# Patient Record
Sex: FEMALE | Race: WHITE | NOT HISPANIC OR LATINO | Employment: OTHER | URBAN - NONMETROPOLITAN AREA
[De-identification: names, ages, dates, MRNs, and addresses within clinical notes are randomized per-mention and may not be internally consistent; named-entity substitution may affect disease eponyms.]

---

## 2017-01-10 ENCOUNTER — OFFICE VISIT (OUTPATIENT)
Dept: FAMILY MEDICINE CLINIC | Facility: CLINIC | Age: 62
End: 2017-01-10

## 2017-01-10 ENCOUNTER — TELEPHONE (OUTPATIENT)
Dept: FAMILY MEDICINE CLINIC | Facility: CLINIC | Age: 62
End: 2017-01-10

## 2017-01-10 VITALS
SYSTOLIC BLOOD PRESSURE: 104 MMHG | HEIGHT: 66 IN | OXYGEN SATURATION: 99 % | TEMPERATURE: 97.5 F | WEIGHT: 185.6 LBS | HEART RATE: 88 BPM | BODY MASS INDEX: 29.83 KG/M2 | DIASTOLIC BLOOD PRESSURE: 60 MMHG

## 2017-01-10 DIAGNOSIS — J01.00 ACUTE MAXILLARY SINUSITIS, RECURRENCE NOT SPECIFIED: Primary | ICD-10-CM

## 2017-01-10 DIAGNOSIS — R42 DIZZINESS: ICD-10-CM

## 2017-01-10 DIAGNOSIS — H93.8X1 EAR CONGESTION, RIGHT: ICD-10-CM

## 2017-01-10 DIAGNOSIS — R53.83 FATIGUE, UNSPECIFIED TYPE: ICD-10-CM

## 2017-01-10 LAB
BASOPHILS NFR BLD AUTO: 0.7 % (ref 0–2)
EOSINOPHIL NFR BLD AUTO: 0.9 % (ref 0–7)
ERYTHROCYTE [DISTWIDTH] IN BLOOD: 12.5 % (ref 11.5–14.5)
GRANULOCYTES NFR BLD AUTO: 59.6 % (ref 37–80)
HCT VFR BLD CALC: 40.2 % (ref 35–45)
HGB BLD-MCNC: 13 GM/DL (ref 12–15.5)
LYMPHOCYTES NFR BLD AUTO: 27.7 % (ref 10–50)
MCH RBC QN: 28 PG (ref 26–34)
MCHC RBC-ENTMCNC: 32.3 GM/DL (ref 31.4–36)
MCV RBC: 86.6 FL (ref 80–98)
MONOCYTES NFR BLD AUTO: 11.1 % (ref 0–12)
NRBC BLD AUTO-RTO: 0 %
NRBC SPEC MANUAL: 0
PLATELET # BLD: 187 X1000/MM3 (ref 150–450)
PMV BLD: 11.1 FL (ref 8–12)
RBC # BLD: 4.64 MEGA/MM3 (ref 3.77–5.16)
WBC # BLD: 4.6 X1000/UL (ref 3.2–9.8)

## 2017-01-10 PROCEDURE — 96372 THER/PROPH/DIAG INJ SC/IM: CPT | Performed by: NURSE PRACTITIONER

## 2017-01-10 PROCEDURE — 99213 OFFICE O/P EST LOW 20 MIN: CPT | Performed by: NURSE PRACTITIONER

## 2017-01-10 RX ORDER — CEFTRIAXONE 1 G/1
1 INJECTION, POWDER, FOR SOLUTION INTRAMUSCULAR; INTRAVENOUS ONCE
Status: COMPLETED | OUTPATIENT
Start: 2017-01-10 | End: 2017-01-10

## 2017-01-10 RX ORDER — CEFDINIR 300 MG/1
300 CAPSULE ORAL 2 TIMES DAILY
Qty: 20 CAPSULE | Refills: 0 | Status: SHIPPED | OUTPATIENT
Start: 2017-01-10 | End: 2017-01-20

## 2017-01-10 RX ORDER — MECLIZINE HYDROCHLORIDE 25 MG/1
25 TABLET ORAL 3 TIMES DAILY PRN
Qty: 30 TABLET | Refills: 2 | Status: SHIPPED | OUTPATIENT
Start: 2017-01-10 | End: 2017-01-20

## 2017-01-10 RX ORDER — ESTRADIOL 2 MG/1
1 RING VAGINAL WEEKLY
Refills: 3 | COMMUNITY
Start: 2016-12-01 | End: 2017-07-06

## 2017-01-10 RX ORDER — METHYLPREDNISOLONE ACETATE 80 MG/ML
80 INJECTION, SUSPENSION INTRA-ARTICULAR; INTRALESIONAL; INTRAMUSCULAR; SOFT TISSUE ONCE
Status: COMPLETED | OUTPATIENT
Start: 2017-01-10 | End: 2017-01-10

## 2017-01-10 RX ADMIN — METHYLPREDNISOLONE ACETATE 80 MG: 80 INJECTION, SUSPENSION INTRA-ARTICULAR; INTRALESIONAL; INTRAMUSCULAR; SOFT TISSUE at 14:39

## 2017-01-10 RX ADMIN — CEFTRIAXONE 1 G: 1 INJECTION, POWDER, FOR SOLUTION INTRAMUSCULAR; INTRAVENOUS at 14:39

## 2017-01-10 NOTE — PROGRESS NOTES
"Chief Complaint   Patient presents with   • Sinus Problem     off and on \"due to weather\", worsened today   • Loss of Consciousness     c/o \"fainting\" x3 over 2-3 months; denies LOC       Subjective   HPI:   Bertha Moncada is a 61 y.o. female presents to the office for evaluation of:  Sinus Problem   This is a new problem. The current episode started today (off and on \"due to weather. It got worse today\"). The problem has been rapidly worsening since onset. There has been no fever. Her pain is at a severity of 5/10. The pain is mild. Associated symptoms include chills, congestion, coughing, ear pain, headaches and sneezing. Pertinent negatives include no diaphoresis, hoarse voice, neck pain, shortness of breath, sinus pressure, sore throat or swollen glands. Treatments tried: Francisca Farson cold and flu. The treatment provided mild relief.     She presents today with acute complaints of dizziness resulting in fall but not loss of consciousness X 3 over 2-3 months. She c/o of dizziness requiring holding onto a stationary object like the wall.   She also c/o headache, dry cough, chills, fever with Tmax 100.3, ear pain, body aches, rhinorrhea, and overall feeling of malaise X 3 days. She has had exposure to the flu recently.   She has taken alkaseltzer cold and flu with poor relief of symptoms, but she states it does help her sleep.     Patient states she \"feels weird\". She expresses nausea with feeling faint, but denies emesis. She denies chest pain, discomfort, palpitations, and radiation of pain with faint feeling spells. Change in position exacerbates her faint feelings. She denies SOA.     She was seen by cardiology several years ago for chest pain and abnormal EKG after the loss of her child. She states her EKG are always abnormal        Family History   Problem Relation Age of Onset   • Cancer Other      Breast Cancer; Ovarian Cancer   • Hypertension Other    • Cancer Mother    • Ovarian cancer Mother    • Thyroid " disease Mother    • Heart disease Father    • Alcohol abuse Father    • Diabetes Sister      Social History     Social History   • Marital status:      Spouse name: Matthew    • Number of children: N/A   • Years of education: N/A     Occupational History   • Not on file.     Social History Main Topics   • Smoking status: Never Smoker   • Smokeless tobacco: Never Used   • Alcohol use No   • Drug use: No   • Sexual activity: Yes     Partners: Male     Other Topics Concern   • Not on file     Social History Narrative       Review of Systems   Constitutional: Positive for chills. Negative for activity change, diaphoresis, fatigue, fever and unexpected weight change.   HENT: Positive for congestion, ear pain, rhinorrhea and sneezing. Negative for hoarse voice, postnasal drip, sinus pressure, sore throat and voice change.    Eyes: Negative.  Negative for pain and redness.   Respiratory: Positive for cough. Negative for choking, chest tightness, shortness of breath and wheezing.    Cardiovascular: Negative.  Negative for chest pain, palpitations and leg swelling.   Gastrointestinal: Negative.  Negative for abdominal distention, abdominal pain, constipation, diarrhea, nausea and rectal pain.   Endocrine: Negative.    Genitourinary: Negative.  Negative for decreased urine volume, difficulty urinating, dysuria, flank pain, hematuria and urgency.   Musculoskeletal: Negative.  Negative for gait problem and neck pain.   Skin: Negative.  Negative for rash and wound.   Allergic/Immunologic: Negative.    Neurological: Positive for light-headedness and headaches. Negative for dizziness, syncope, weakness and numbness.   Hematological: Negative.    Psychiatric/Behavioral: Negative.  Negative for agitation, behavioral problems, confusion, self-injury, sleep disturbance and suicidal ideas. The patient is not nervous/anxious.      14 Point ROS completed with pertinent positives discussed. All other systems reviewed and are  "negative.     The following portions of the patient's history were reviewed and updated as appropriate: allergies, current medications, past family history, past medical history, past social history, past surgical history and problem list.    Encounter Vitals:  Vitals:    01/10/17 1329 01/10/17 1332 01/10/17 1338   BP: 118/70 110/70 104/60   BP Location: Left arm Left arm Left arm   Patient Position: Sitting Standing Lying   Cuff Size: Adult Adult Adult   Pulse: 88     Temp: 97.5 °F (36.4 °C)     TempSrc: Temporal Artery      SpO2: 99%     Weight: 185 lb 9.6 oz (84.2 kg)     Height: 66\" (167.6 cm)     PainSc:   5     PainLoc: Generalized  Comment: \"aches all over\"         Objective:  Physical Exam   Constitutional: She is oriented to person, place, and time. Vital signs are normal. She appears well-developed and well-nourished.  Non-toxic appearance. She appears ill.   HENT:   Head: Normocephalic and atraumatic.   Right Ear: Tympanic membrane, external ear and ear canal normal.   Left Ear: Tympanic membrane, external ear and ear canal normal.   Nose: Right sinus exhibits maxillary sinus tenderness. Right sinus exhibits no frontal sinus tenderness. Left sinus exhibits maxillary sinus tenderness. Left sinus exhibits no frontal sinus tenderness.   Mouth/Throat: Uvula is midline and mucous membranes are normal. Posterior oropharyngeal edema present. No posterior oropharyngeal erythema. No tonsillar exudate.   Clear post nasal drip  Right ear congestion with serous fluid   Eyes: Lids are normal. Pupils are equal, round, and reactive to light. Right eye exhibits nystagmus. Left eye exhibits nystagmus.   Neck: Trachea normal and normal range of motion. Neck supple. No thyroid mass present.   Cardiovascular: Normal rate, regular rhythm, S1 normal, S2 normal, normal heart sounds and intact distal pulses.  Exam reveals no gallop and no friction rub.    No murmur heard.  Pulmonary/Chest: Effort normal and breath sounds normal. " No respiratory distress. She has no wheezes. She has no rales.   Abdominal: Soft. Normal appearance and bowel sounds are normal. She exhibits no mass. There is no rebound and no guarding.   Musculoskeletal: Normal range of motion.   Lymphadenopathy:     She has no cervical adenopathy.   Neurological: She is alert and oriented to person, place, and time. She has normal strength. No cranial nerve deficit or sensory deficit. Gait normal.   Skin: Skin is warm and dry. No rash noted.   Psychiatric: She has a normal mood and affect. Her speech is normal and behavior is normal. Judgment and thought content normal. Cognition and memory are normal.   Nursing note and vitals reviewed.    Pertinent Labs  Hospital Outpatient Visit on 12/30/2016   Component Date Value Ref Range Status   • Glucose 12/30/2016 107* 70.0 - 100.0 mg/dl Final   • BUN 12/30/2016 18  8.0 - 25.0 mg/dl Final   • Creatinine 12/30/2016 0.6  0.4 - 1.3 mg/dl Final   • Sodium 12/30/2016 141.0  134 - 146 mmol/L Final   • Potassium 12/30/2016 4.4  3.4 - 5.4 mmol/L Final   • Chloride 12/30/2016 106.0  100.0 - 112.0 mmol/L Final   • CO2 12/30/2016 28.0  20.0 - 32.0 mmol/L Final   • Calcium 12/30/2016 9.3  8.4 - 10.8 mg/dl Final   • Total Protein 12/30/2016 6.7  6.7 - 8.2 gm/dl Final   • Albumin 12/30/2016 4.0  3.2 - 5.5 gm/dl Final   • Total Bilirubin 12/30/2016 1.0  0.2 - 1.0 mg/dl Final   • Alkaline Phosphatase 12/30/2016 43  15 - 121 U/L Final   • ALT (SGPT) 12/30/2016 14  10 - 60 U/L Final   • AST (SGOT) 12/30/2016 18  10 - 60 U/L Final   • GFR MDRD Non  12/30/2016 101  45 - 104 mL/min/1.73 sq.M Final    Comment: Invalid if creatinine is changing or the patient is on dialysis. Use AA  result if patient is -American, non AA result otherwise.     • GFR MDRD  12/30/2016 123* 45 - 104 mL/min/1.73 sq.M Final   • Anion Gap 12/30/2016 7.0  5.0 - 15.0 mmol/L Final   • LDL Cholesterol  12/30/2016 119.0  0.0 - 129.0 mg/dl Final     LDL DESIRED: < 130 MG/DL     Labs have been independently reviewed.    Key Imaging/Tracings/POC Testing    Assessment and Plan:  Problem List Items Addressed This Visit     None      Visit Diagnoses     Acute maxillary sinusitis, recurrence not specified    -  Primary    Relevant Medications    cefTRIAXone (ROCEPHIN) injection 1 g (Start on 1/10/2017  3:00 PM)    methylPREDNISolone acetate (DEPO-medrol) injection 80 mg (Start on 1/10/2017  3:00 PM)    Dizziness        Ear congestion, right        Fatigue, unspecified type        Relevant Orders    CBC & Differential    Millie-Barr Virus VCA Antibody Panel        Bertha was seen today for sinus problem and loss of consciousness.    Diagnoses and all orders for this visit:    Acute maxillary sinusitis, recurrence not specified  -     cefTRIAXone (ROCEPHIN) injection 1 g; Inject 1 g into the shoulder, thigh, or buttocks 1 (One) Time.  -     methylPREDNISolone acetate (DEPO-medrol) injection 80 mg; Inject 1 mL into the shoulder, thigh, or buttocks 1 (One) Time.    Dizziness    Ear congestion, right    Fatigue, unspecified type  -     CBC & Differential  -     Millie-Barr Virus VCA Antibody Panel    Other orders  -     cefdinir (OMNICEF) 300 MG capsule; Take 1 capsule by mouth 2 (Two) Times a Day for 10 days.  -     meclizine (ANTIVERT) 25 MG tablet; Take 1 tablet by mouth 3 (Three) Times a Day As Needed for dizziness for up to 10 days.      Side effects of ordered medications discussed with patient.     Additional Notes/Instructions  Discussed possible cardiac etiology with Dr. Saldana. He will reassess possible need for cardiology work-up/referral during her appointment on Fri. 1/13/2017    Follow-Up  Return if symptoms worsen or fail to improve, for After ordered studies are completed.    Patient/caregiver verbalizes understanding of all orders and instructions in this plan of care.

## 2017-01-10 NOTE — MR AVS SNAPSHOT
Bertha Moncada   1/10/2017 1:45 PM   Office Visit    Dept Phone:  [phone]   Encounter #:  [id number]    Provider:  DILLAN Figueroa   Department:  [facility]                Your Full Care Plan              Today's Medication Changes          These changes are accurate as of: 1/10/17  3:14 PM.  If you have any questions, ask your nurse or doctor.               New Medication(s)Ordered:     cefdinir 300 MG capsule   Commonly known as:  OMNICEF   Take 1 capsule by mouth 2 (Two) Times a Day for 10 days.   Started by:  DILLAN Figueroa       meclizine 25 MG tablet   Commonly known as:  ANTIVERT   Take 1 tablet by mouth 3 (Three) Times a Day As Needed for dizziness for up to 10 days.   Started by:  DILLAN Figueroa         Stop taking medication(s)listed here:     Cholecalciferol 2000 UNITS tablet   Stopped by:  DILLAN Figueroa           magnesium oxide 250 MG tablet   Stopped by:  DILLAN Figueroa                Where to Get Your Medications      These medications were sent to [facility] - [address] [phone] [place] [id number] [address] [address]     Phone:  [phone]     cefdinir 300 MG capsule    meclizine 25 MG tablet                  Your Updated Medication List          This list is accurate as of: 1/10/17  3:14 PM.  Always use your most recent med list.                ALPRAZolam 0.5 MG tablet   Commonly known as:  XANAX   Take 1 tablet by mouth 3 (Three) Times a Day As Needed for anxiety. 1 tab(s) by mouth every 8 hours as needed       atorvastatin 10 MG tablet   Commonly known as:  LIPITOR       cefdinir 300 MG capsule   Commonly known as:  OMNICEF   Take 1 capsule by mouth 2 (Two) Times a Day for 10 days.       Desoximetasone 0.05 % ointment   Apply 1 application topically 2 (Two) Times a Day.       ESTRING 2 MG vaginal ring   Generic drug:  estradiol       FLUoxetine 20 MG capsule   Commonly known as:  PROzac   Take 3 capsules by mouth Daily. Take 3 capsules by mouth every day before noon       meclizine 25 MG tablet   Commonly known as:  ANTIVERT   Take 1 tablet by mouth 3 (Three) Times a Day As Needed for dizziness for up to 10 days.       MULTIVITAMIN PO       omeprazole 20 MG capsule   Commonly known as:  priLOSEC   Take 1 capsule by mouth 2 (Two) Times a Day.               We Performed the Following     CBC & Differential     Millie-Barr Virus VCA Antibody Panel       You Were Diagnosed With        Codes Comments    Acute maxillary sinusitis, recurrence not specified    -  Primary ICD-10-CM: J01.00  ICD-9-CM: 461.0     Dizziness     ICD-10-CM: R42  ICD-9-CM: 780.4     Ear congestion, right     ICD-10-CM: H93.8X1  ICD-9-CM: 388.8     Fatigue, unspecified type     ICD-10-CM: R53.83  ICD-9-CM: 780.79       Medications to be Given to You by a Medical Professional     Due       Frequency    (none) cefTRIAXone (ROCEPHIN) injection 1 g  Once    (none) methylPREDNISolone acetate (DEPO-medrol) injection 80 mg  Once      Instructions     None    Patient Instructions History      Upcoming Appointments     Visit Type Date Time Department    OFFICE VISIT 1/10/2017  1:45 PM [facility]    OFFICE VISIT 2017 10:15 AM [facility]    FOLLOW UP 11/10/2017 10:15 AM [facility] ROBERTO Torres Signup     [facility] [employer] allows you to send messages to your doctor, view your test results, renew your prescriptions, schedule appointments, and more. To sign up, go to [employer] and click on the Sign Up Now link in the New User? box. Enter your [employer] Activation Code exactly as it appears below along with the last four digits of your Social Security Number and your Date of Birth () to complete the sign-up process. If you do not sign up before the expiration date, you must request a new code.    [employer] Activation Code:  [email or web address]  Expires: 1/24/2017  3:14 PM    If you have questions, you can email [email or web address] or call [phone] to talk to our MyChart staff. Remember, [employer] is NOT to be used for urgent needs. For medical emergencies, dial 911.               Other Info from Your Visit           Your Appointments     Jan 13, 2017 10:15 AM CST   Office Visit with Matthew Saldana MD   [facility] (--)    [address] [place] [address] [phone]           Arrive 15 minutes prior to appointment.            Nov 10, 2017 10:15 AM CST   Follow Up with SLEEP CLINIC [facility] (--)    [address] [place] [address] [address] [place] [address] [phone]           Arrive 15 minutes prior to appointment.              Allergies     Amoxicillin        Reason for Visit     Sinus Problem off and on \"due to weather\", worsened today    Loss of Consciousness c/o \"fainting\" x3 over 2-3 months; denies LOC      Vital Signs     Blood Pressure Pulse Temperature Height    104/60 (BP Location: Left arm, Patient Position: Lying, Cuff Size: Adult) 88 97.5 °F (36.4 °C) (Temporal Artery ) 66\" (167.6 cm)    Weight Oxygen Saturation Body Mass Index Smoking Status    185 lb 9.6 oz (84.2 kg) 99% 29.96 kg/m2 Never Smoker      Problems and Diagnoses Noted     Acute maxillary sinusitis    -  Primary    Dizziness        Ear congestion        Tiredness          Medications Administered     cefTRIAXone (ROCEPHIN) injection 1 g                  methylPREDNISolone acetate (DEPO-medrol) injection 80 mg                      "

## 2017-01-11 NOTE — TELEPHONE ENCOUNTER
Attempted to reach patient via telephone. No answer. Left message informing patient that her labwork was okay and the EBV titers were still pending. Advised to return call to office if she should have any questions, comments, or concerns.

## 2017-01-13 ENCOUNTER — OFFICE VISIT (OUTPATIENT)
Dept: FAMILY MEDICINE CLINIC | Facility: CLINIC | Age: 62
End: 2017-01-13

## 2017-01-13 ENCOUNTER — TELEPHONE (OUTPATIENT)
Dept: FAMILY MEDICINE CLINIC | Facility: CLINIC | Age: 62
End: 2017-01-13

## 2017-01-13 VITALS
TEMPERATURE: 98.2 F | DIASTOLIC BLOOD PRESSURE: 72 MMHG | HEART RATE: 62 BPM | SYSTOLIC BLOOD PRESSURE: 116 MMHG | HEIGHT: 66 IN | BODY MASS INDEX: 29.73 KG/M2 | WEIGHT: 185 LBS

## 2017-01-13 DIAGNOSIS — E78.5 HYPERLIPIDEMIA, UNSPECIFIED HYPERLIPIDEMIA TYPE: ICD-10-CM

## 2017-01-13 DIAGNOSIS — R55 NEAR SYNCOPE: Primary | ICD-10-CM

## 2017-01-13 DIAGNOSIS — F33.42 RECURRENT MAJOR DEPRESSIVE DISORDER, IN FULL REMISSION (HCC): ICD-10-CM

## 2017-01-13 PROCEDURE — 99214 OFFICE O/P EST MOD 30 MIN: CPT | Performed by: FAMILY MEDICINE

## 2017-01-13 NOTE — PROGRESS NOTES
Subjective   Bertha Moncada is a 61 y.o. female.   Chief Complaint   Patient presents with   • Results     lab       History of Present Illness   Patient is in today for reevaluation.  Patient has recently seen my nurse practitioner after near syncopal episode.  She denies chest pain but did complain of dizziness especially with position change and has had 2 falls.  She been complaining of fatigue and lab work had been drawn and this is been reviewed with the patient.  The following portions of the patient's history were reviewed and updated as appropriate: allergies, current medications, past family history, past medical history, past social history, past surgical history and problem list.    Review of Systems   Constitutional: Positive for fatigue.   HENT: Positive for congestion.    Eyes: Negative.    Respiratory: Negative.    Cardiovascular: Negative.    Gastrointestinal: Negative.    Endocrine: Negative.    Genitourinary: Negative.    Musculoskeletal: Negative.    Skin: Negative.    Allergic/Immunologic: Negative.    Neurological: Negative.    Hematological: Negative.    Psychiatric/Behavioral: Negative.    All other systems reviewed and are negative.      Objective   Physical Exam   Constitutional: She is oriented to person, place, and time. She appears well-developed and well-nourished.   HENT:   Head: Normocephalic and atraumatic.   Right Ear: External ear normal.   Left Ear: External ear normal.   Nose: Nose normal.   Mouth/Throat: Oropharynx is clear and moist.   Eyes: Conjunctivae and EOM are normal. Pupils are equal, round, and reactive to light.   Neck: Normal range of motion. Neck supple.   Cardiovascular: Normal rate, regular rhythm, normal heart sounds and intact distal pulses.  Exam reveals no gallop and no friction rub.    No murmur heard.  Pulmonary/Chest: Effort normal and breath sounds normal. She has no wheezes. She has no rales.   Abdominal: Soft. Bowel sounds are normal. She exhibits no mass.  There is no tenderness. There is no rebound and no guarding.   Musculoskeletal: Normal range of motion.   Neurological: She is alert and oriented to person, place, and time. She has normal reflexes. No cranial nerve deficit. She exhibits normal muscle tone.   Skin: Skin is warm and dry. No rash noted.   Psychiatric: She has a normal mood and affect. Her behavior is normal. Judgment and thought content normal.   Nursing note and vitals reviewed.      Assessment/Plan   Bertha was seen today for results.    Diagnoses and all orders for this visit:    Near syncope  -     Ambulatory Referral to Cardiology    Recurrent major depressive disorder, in full remission    Hyperlipidemia, unspecified hyperlipidemia type

## 2017-01-13 NOTE — TELEPHONE ENCOUNTER
Phone call placed to patient at this time. Informed patient that labwork was negative for mono. No further questions, comments, or concerns.

## 2017-01-13 NOTE — MR AVS SNAPSHOT
Bertha Moncada   1/13/2017 10:15 AM   Office Visit    Dept Phone:  [phone]   Encounter #:  [id number]    Provider:  Matthew Saldana MD   Department:  [facility]                Your Full Care Plan              Your Updated Medication List          This list is accurate as of: 1/13/17  2:33 PM.  Always use your most recent med list.                ALPRAZolam 0.5 MG tablet   Commonly known as:  XANAX   Take 1 tablet by mouth 3 (Three) Times a Day As Needed for anxiety. 1 tab(s) by mouth every 8 hours as needed       atorvastatin 10 MG tablet   Commonly known as:  LIPITOR       cefdinir 300 MG capsule   Commonly known as:  OMNICEF   Take 1 capsule by mouth 2 (Two) Times a Day for 10 days.       Desoximetasone 0.05 % ointment   Apply 1 application topically 2 (Two) Times a Day.       ESTRING 2 MG vaginal ring   Generic drug:  estradiol       FLUoxetine 20 MG capsule   Commonly known as:  PROzac   Take 3 capsules by mouth Daily. Take 3 capsules by mouth every day before noon       meclizine 25 MG tablet   Commonly known as:  ANTIVERT   Take 1 tablet by mouth 3 (Three) Times a Day As Needed for dizziness for up to 10 days.       MULTIVITAMIN PO       omeprazole 20 MG capsule   Commonly known as:  priLOSEC   Take 1 capsule by mouth 2 (Two) Times a Day.               We Performed the Following     Ambulatory Referral to Cardiology       You Were Diagnosed With        Codes Comments    Near syncope    -  Primary ICD-10-CM: R55  ICD-9-CM: 780.2     Recurrent major depressive disorder, in full remission     ICD-10-CM: F33.42  ICD-9-CM: 296.36       Instructions     None    Patient Instructions History      Upcoming Appointments     Visit Type Date Time Department    OFFICE VISIT 1/13/2017 10:15 AM [facility]    FOLLOW UP 11/10/2017 10:15 AM [facility] [employer]      [place] Signup     [facility] allows you to send messages to your doctor, view  "your test results, renew your prescriptions, schedule appointments, and more. To sign up, go to [employer] and click on the Sign Up Now link in the New User? box. Enter your [employer] Activation Code exactly as it appears below along with the last four digits of your Social Security Number and your Date of Birth () to complete the sign-up process. If you do not sign up before the expiration date, you must request a new code.    [employer] Activation Code: [email or web address]  Expires: 2017  3:14 PM    If you have questions, you can email [email or web address] or call [phone] to talk to our [employer] staff. Remember, [employer] is NOT to be used for urgent needs. For medical emergencies, dial 911.               Other Info from Your Visit           Your Appointments     Nov 10, 2017 10:15 AM CST   Follow Up with SLEEP CLINIC [facility] (--)    [address] [place] [address] [address] [place] [address] [phone]           Arrive 15 minutes prior to appointment.              Allergies     Amoxicillin        Reason for Visit     Results lab      Vital Signs     Blood Pressure Pulse Temperature Height Weight Body Mass Index    116/72 62 98.2 °F (36.8 °C) 66\" (167.6 cm) 185 lb (83.9 kg) 29.86 kg/m2    Smoking Status                   Never Smoker           Problems and Diagnoses Noted     Recurrent major depressive disorder, in full remission    Near syncope    -  Primary        "

## 2017-01-15 PROBLEM — E78.5 HYPERLIPIDEMIA: Status: ACTIVE | Noted: 2017-01-15

## 2017-04-27 DIAGNOSIS — R73.9 HYPERGLYCEMIA: ICD-10-CM

## 2017-04-27 DIAGNOSIS — R53.83 FATIGUE, UNSPECIFIED TYPE: ICD-10-CM

## 2017-04-27 DIAGNOSIS — E78.5 HYPERLIPIDEMIA, UNSPECIFIED HYPERLIPIDEMIA TYPE: ICD-10-CM

## 2017-04-27 DIAGNOSIS — Z80.9 FAMILY HISTORY OF CANCER: Primary | ICD-10-CM

## 2017-06-21 RX ORDER — FLUOXETINE HYDROCHLORIDE 20 MG/1
CAPSULE ORAL
Qty: 90 CAPSULE | Refills: 5 | Status: SHIPPED | OUTPATIENT
Start: 2017-06-21 | End: 2017-12-27 | Stop reason: SDUPTHER

## 2017-06-21 RX ORDER — ATORVASTATIN CALCIUM 10 MG/1
TABLET, FILM COATED ORAL
Qty: 30 TABLET | Refills: 5 | Status: SHIPPED | OUTPATIENT
Start: 2017-06-21 | End: 2018-07-30 | Stop reason: SDUPTHER

## 2017-06-29 ENCOUNTER — LAB (OUTPATIENT)
Dept: LAB | Facility: OTHER | Age: 62
End: 2017-06-29

## 2017-06-29 DIAGNOSIS — R53.83 FATIGUE, UNSPECIFIED TYPE: ICD-10-CM

## 2017-06-29 DIAGNOSIS — E78.5 HYPERLIPIDEMIA, UNSPECIFIED HYPERLIPIDEMIA TYPE: ICD-10-CM

## 2017-06-29 DIAGNOSIS — Z80.9 FAMILY HISTORY OF CANCER: ICD-10-CM

## 2017-06-29 DIAGNOSIS — R73.9 HYPERGLYCEMIA: ICD-10-CM

## 2017-06-29 LAB
ALBUMIN SERPL-MCNC: 4.2 G/DL (ref 3.2–5.5)
ALBUMIN/GLOB SERPL: 1.5 G/DL (ref 1–3)
ALP SERPL-CCNC: 38 U/L (ref 15–121)
ALT SERPL W P-5'-P-CCNC: 18 U/L (ref 10–60)
ANION GAP SERPL CALCULATED.3IONS-SCNC: 9 MMOL/L (ref 5–15)
AST SERPL-CCNC: 21 U/L (ref 10–60)
BASOPHILS # BLD AUTO: 0.03 10*3/MM3 (ref 0–0.2)
BASOPHILS NFR BLD AUTO: 0.8 % (ref 0–2)
BILIRUB SERPL-MCNC: 0.7 MG/DL (ref 0.2–1)
BUN BLD-MCNC: 23 MG/DL (ref 8–25)
BUN/CREAT SERPL: 38.3 (ref 7–25)
CALCIUM SPEC-SCNC: 9.3 MG/DL (ref 8.4–10.8)
CHLORIDE SERPL-SCNC: 105 MMOL/L (ref 100–112)
CHOLEST SERPL-MCNC: 214 MG/DL (ref 150–200)
CO2 SERPL-SCNC: 26 MMOL/L (ref 20–32)
CREAT BLD-MCNC: 0.6 MG/DL (ref 0.4–1.3)
DEPRECATED RDW RBC AUTO: 39 FL (ref 36.4–46.3)
EOSINOPHIL # BLD AUTO: 0.06 10*3/MM3 (ref 0–0.7)
EOSINOPHIL NFR BLD AUTO: 1.5 % (ref 0–7)
ERYTHROCYTE [DISTWIDTH] IN BLOOD BY AUTOMATED COUNT: 12.6 % (ref 11.5–14.5)
GFR SERPL CREATININE-BSD FRML MDRD: 101 ML/MIN/1.73 (ref 45–104)
GLOBULIN UR ELPH-MCNC: 2.8 GM/DL (ref 2.5–4.6)
GLUCOSE BLD-MCNC: 104 MG/DL (ref 70–100)
HCT VFR BLD AUTO: 40 % (ref 35–45)
HDLC SERPL-MCNC: 70 MG/DL (ref 35–100)
HGB BLD-MCNC: 13.4 G/DL (ref 12–15.5)
LDLC SERPL CALC-MCNC: 137 MG/DL
LDLC/HDLC SERPL: 1.95 {RATIO}
LYMPHOCYTES # BLD AUTO: 1.2 10*3/MM3 (ref 0.6–4.2)
LYMPHOCYTES NFR BLD AUTO: 30.8 % (ref 10–50)
MCH RBC QN AUTO: 29.2 PG (ref 26.5–34)
MCHC RBC AUTO-ENTMCNC: 33.5 G/DL (ref 31.4–36)
MCV RBC AUTO: 87.1 FL (ref 80–98)
MONOCYTES # BLD AUTO: 0.48 10*3/MM3 (ref 0–0.9)
MONOCYTES NFR BLD AUTO: 12.3 % (ref 0–12)
NEUTROPHILS # BLD AUTO: 2.13 10*3/MM3 (ref 2–8.6)
NEUTROPHILS NFR BLD AUTO: 54.6 % (ref 37–80)
PLATELET # BLD AUTO: 182 10*3/MM3 (ref 150–450)
PMV BLD AUTO: 10.8 FL (ref 8–12)
POTASSIUM BLD-SCNC: 4.3 MMOL/L (ref 3.4–5.4)
PROT SERPL-MCNC: 7 G/DL (ref 6.7–8.2)
RBC # BLD AUTO: 4.59 10*6/MM3 (ref 3.77–5.16)
SODIUM BLD-SCNC: 140 MMOL/L (ref 134–146)
TRIGL SERPL-MCNC: 36 MG/DL (ref 35–160)
VLDLC SERPL-MCNC: 7.2 MG/DL
WBC NRBC COR # BLD: 3.9 10*3/MM3 (ref 3.2–9.8)

## 2017-06-29 PROCEDURE — 85025 COMPLETE CBC W/AUTO DIFF WBC: CPT | Performed by: FAMILY MEDICINE

## 2017-06-29 PROCEDURE — 84443 ASSAY THYROID STIM HORMONE: CPT | Performed by: FAMILY MEDICINE

## 2017-06-29 PROCEDURE — 86304 IMMUNOASSAY TUMOR CA 125: CPT | Performed by: FAMILY MEDICINE

## 2017-06-29 PROCEDURE — 80061 LIPID PANEL: CPT | Performed by: FAMILY MEDICINE

## 2017-06-29 PROCEDURE — 36415 COLL VENOUS BLD VENIPUNCTURE: CPT | Performed by: FAMILY MEDICINE

## 2017-06-29 PROCEDURE — 80053 COMPREHEN METABOLIC PANEL: CPT | Performed by: FAMILY MEDICINE

## 2017-06-29 PROCEDURE — 83036 HEMOGLOBIN GLYCOSYLATED A1C: CPT | Performed by: FAMILY MEDICINE

## 2017-06-29 PROCEDURE — 82607 VITAMIN B-12: CPT | Performed by: FAMILY MEDICINE

## 2017-06-29 PROCEDURE — 84439 ASSAY OF FREE THYROXINE: CPT | Performed by: FAMILY MEDICINE

## 2017-06-30 LAB
HBA1C MFR BLD: 5.7 % (ref 4–5.6)
T4 FREE SERPL-MCNC: 0.97 NG/DL (ref 0.78–2.19)
TSH SERPL DL<=0.05 MIU/L-ACNC: 2.52 MIU/ML (ref 0.46–4.68)
VIT B12 BLD-MCNC: 477 PG/ML (ref 239–931)

## 2017-07-01 LAB — CANCER AG125 SERPL-ACNC: 9.3 U/ML (ref 0–38.1)

## 2017-07-06 ENCOUNTER — OFFICE VISIT (OUTPATIENT)
Dept: FAMILY MEDICINE CLINIC | Facility: CLINIC | Age: 62
End: 2017-07-06

## 2017-07-06 VITALS
OXYGEN SATURATION: 94 % | TEMPERATURE: 98.7 F | BODY MASS INDEX: 30.41 KG/M2 | DIASTOLIC BLOOD PRESSURE: 60 MMHG | HEART RATE: 75 BPM | SYSTOLIC BLOOD PRESSURE: 122 MMHG | HEIGHT: 66 IN | WEIGHT: 189.2 LBS | RESPIRATION RATE: 18 BRPM

## 2017-07-06 DIAGNOSIS — Z99.89 OSA ON CPAP: ICD-10-CM

## 2017-07-06 DIAGNOSIS — F33.42 RECURRENT MAJOR DEPRESSIVE DISORDER, IN FULL REMISSION (HCC): ICD-10-CM

## 2017-07-06 DIAGNOSIS — E78.01 FAMILIAL HYPERCHOLESTEROLEMIA: Primary | ICD-10-CM

## 2017-07-06 DIAGNOSIS — G47.33 OSA ON CPAP: ICD-10-CM

## 2017-07-06 DIAGNOSIS — F41.9 ANXIETY: ICD-10-CM

## 2017-07-06 DIAGNOSIS — R73.9 HYPERGLYCEMIA: ICD-10-CM

## 2017-07-06 DIAGNOSIS — K21.9 GASTROESOPHAGEAL REFLUX DISEASE WITHOUT ESOPHAGITIS: ICD-10-CM

## 2017-07-06 PROCEDURE — 99214 OFFICE O/P EST MOD 30 MIN: CPT | Performed by: FAMILY MEDICINE

## 2017-07-06 RX ORDER — TRAZODONE HYDROCHLORIDE 50 MG/1
50 TABLET ORAL NIGHTLY
Qty: 30 TABLET | Refills: 6 | Status: SHIPPED | OUTPATIENT
Start: 2017-07-06 | End: 2018-03-07

## 2017-07-06 NOTE — PATIENT INSTRUCTIONS

## 2017-07-06 NOTE — PROGRESS NOTES
Subjective   Bertha Moncada is a 61 y.o. female who presents to the office for follow-up and review of labs.     History of Present Illness   Patient with history of hyperlipidemia, hyperglycemia, and depression is in today for reevaluation and to review labs.  Patient is doing fairly well but still having some difficulty with some chronic insomnia.  This is been present for many months.  She denies any chest pain, PND, orthopnea.  She has had no neurological symptoms.  Some increased stress due to possible skilled nursing and health problems of her .    The following portions of the patient's history were reviewed and updated as appropriate: allergies, current medications, past family history, past medical history, past social history, past surgical history and problem list.    Review of Systems   Constitutional: Positive for fatigue.   HENT: Positive for congestion.    Eyes: Negative.    Respiratory: Negative.    Cardiovascular: Negative.    Gastrointestinal: Negative.    Endocrine: Negative.    Genitourinary: Negative.    Musculoskeletal: Negative.    Skin: Negative.    Allergic/Immunologic: Negative.    Neurological: Negative.    Hematological: Negative.    Psychiatric/Behavioral: Negative.    All other systems reviewed and are negative.      Objective   Physical Exam   Constitutional: She is oriented to person, place, and time. She appears well-developed and well-nourished.   HENT:   Head: Normocephalic and atraumatic.   Right Ear: External ear normal.   Left Ear: External ear normal.   Nose: Nose normal.   Mouth/Throat: Oropharynx is clear and moist.   Eyes: Conjunctivae and EOM are normal. Pupils are equal, round, and reactive to light.   Neck: Normal range of motion. Neck supple.   Cardiovascular: Normal rate, regular rhythm, normal heart sounds and intact distal pulses.  Exam reveals no gallop and no friction rub.    No murmur heard.  Pulmonary/Chest: Effort normal and breath sounds normal. She has no  wheezes. She has no rales.   Abdominal: Soft. Bowel sounds are normal. She exhibits no mass. There is no tenderness. There is no rebound and no guarding.   Musculoskeletal: Normal range of motion.   Neurological: She is alert and oriented to person, place, and time. She has normal reflexes. No cranial nerve deficit. She exhibits normal muscle tone.   Skin: Skin is warm and dry. No rash noted.   Psychiatric: She has a normal mood and affect. Her behavior is normal. Judgment and thought content normal.   Nursing note and vitals reviewed.      Assessment/Plan   Bertha was seen today for hyperlipidemia, hyperglycemia and ear fullness.    Diagnoses and all orders for this visit:    Familial hypercholesterolemia    Gastroesophageal reflux disease without esophagitis    Recurrent major depressive disorder, in full remission    Anxiety    MADHAV on CPAP    Hyperglycemia  -     Comprehensive Metabolic Panel; Future  -     Hemoglobin A1c; Future  -     LDL Cholesterol, Direct; Future    Other orders  -     traZODone (DESYREL) 50 MG tablet; Take 1 tablet by mouth Every Night.         Labs are reviewed with patient.    PHQ-9 Depression Screening 7/6/2017   Little interest or pleasure in doing things 0   Feeling down, depressed, or hopeless 1   PHQ-9 Total Score 1         Lab on 06/29/2017   Component Date Value Ref Range Status   •  06/29/2017 9.3  0.0 - 38.1 U/mL Final    Roche ECLIA methodology   • Glucose 06/29/2017 104* 70 - 100 mg/dL Final   • BUN 06/29/2017 23  8 - 25 mg/dL Final   • Creatinine 06/29/2017 0.60  0.40 - 1.30 mg/dL Final   • Sodium 06/29/2017 140  134 - 146 mmol/L Final   • Potassium 06/29/2017 4.3  3.4 - 5.4 mmol/L Final   • Chloride 06/29/2017 105  100 - 112 mmol/L Final   • CO2 06/29/2017 26.0  20.0 - 32.0 mmol/L Final   • Calcium 06/29/2017 9.3  8.4 - 10.8 mg/dL Final   • Total Protein 06/29/2017 7.0  6.7 - 8.2 g/dL Final   • Albumin 06/29/2017 4.20  3.20 - 5.50 g/dL Final   • ALT (SGPT) 06/29/2017 18   10 - 60 U/L Final   • AST (SGOT) 06/29/2017 21  10 - 60 U/L Final   • Alkaline Phosphatase 06/29/2017 38  15 - 121 U/L Final   • Total Bilirubin 06/29/2017 0.7  0.2 - 1.0 mg/dL Final   • eGFR Non African Amer 06/29/2017 101  45 - 104 mL/min/1.73 Final   • Globulin 06/29/2017 2.8  2.5 - 4.6 gm/dL Final   • A/G Ratio 06/29/2017 1.5  1.0 - 3.0 g/dL Final   • BUN/Creatinine Ratio 06/29/2017 38.3* 7.0 - 25.0 Final   • Anion Gap 06/29/2017 9.0  5.0 - 15.0 mmol/L Final   • Total Cholesterol 06/29/2017 214* 150 - 200 mg/dL Final   • Triglycerides 06/29/2017 36  35 - 160 mg/dL Final   • HDL Cholesterol 06/29/2017 70  35 - 100 mg/dL Final   • LDL Cholesterol  06/29/2017 137  mg/dL Final   • VLDL Cholesterol 06/29/2017 7.2  mg/dL Final   • LDL/HDL Ratio 06/29/2017 1.95   Final   • TSH 06/29/2017 2.520  0.460 - 4.680 mIU/mL Final   • Free T4 06/29/2017 0.97  0.78 - 2.19 ng/dL Final   • Hemoglobin A1C 06/29/2017 5.70* 4 - 5.6 % Final   • Vitamin B-12 06/29/2017 477  239 - 931 pg/mL Final   • WBC 06/29/2017 3.90  3.20 - 9.80 10*3/mm3 Final   • RBC 06/29/2017 4.59  3.77 - 5.16 10*6/mm3 Final   • Hemoglobin 06/29/2017 13.4  12.0 - 15.5 g/dL Final   • Hematocrit 06/29/2017 40.0  35.0 - 45.0 % Final   • MCV 06/29/2017 87.1  80.0 - 98.0 fL Final   • MCH 06/29/2017 29.2  26.5 - 34.0 pg Final   • MCHC 06/29/2017 33.5  31.4 - 36.0 g/dL Final   • RDW 06/29/2017 12.6  11.5 - 14.5 % Final   • RDW-SD 06/29/2017 39.0  36.4 - 46.3 fl Final   • MPV 06/29/2017 10.8  8.0 - 12.0 fL Final   • Platelets 06/29/2017 182  150 - 450 10*3/mm3 Final   • Neutrophil % 06/29/2017 54.6  37.0 - 80.0 % Final   • Lymphocyte % 06/29/2017 30.8  10.0 - 50.0 % Final   • Monocyte % 06/29/2017 12.3* 0.0 - 12.0 % Final   • Eosinophil % 06/29/2017 1.5  0.0 - 7.0 % Final   • Basophil % 06/29/2017 0.8  0.0 - 2.0 % Final   • Neutrophils, Absolute 06/29/2017 2.13  2.00 - 8.60 10*3/mm3 Final   • Lymphocytes, Absolute 06/29/2017 1.20  0.60 - 4.20 10*3/mm3 Final   •  Monocytes, Absolute 06/29/2017 0.48  0.00 - 0.90 10*3/mm3 Final   • Eosinophils, Absolute 06/29/2017 0.06  0.00 - 0.70 10*3/mm3 Final   • Basophils, Absolute 06/29/2017 0.03  0.00 - 0.20 10*3/mm3 Final   Hospital Outpatient Visit on 01/10/2017   Component Date Value Ref Range Status   • EBV VCA IgM 01/10/2017 <36.0  0.0 - 35.9 U/mL Final    Comment:                                  Negative        <36.0                                   Equivocal 36.0 - 43.9                                   Positive        >43.9     Office Visit on 01/10/2017   Component Date Value Ref Range Status   • WBC 01/10/2017 4.6  3.2 - 9.8 x1000/uL Final   • RBC 01/10/2017 4.64  3.77 - 5.16 burton/mm3 Final   • Hemoglobin 01/10/2017 13.0  12.0 - 15.5 gm/dl Final   • Hematocrit 01/10/2017 40.2  35.0 - 45.0 % Final   • MCV 01/10/2017 86.6  80.0 - 98.0 fl Final   • MCH 01/10/2017 28.0  26.0 - 34.0 pg Final   • MCHC 01/10/2017 32.3  31.4 - 36.0 gm/dl Final   • Platelets 01/10/2017 187  150 - 450 x1000/mm3 Final   • RDW 01/10/2017 12.5  11.5 - 14.5 % Final   • MPV 01/10/2017 11.1  8.0 - 12.0 fl Final   • Neutrophil Rel % 01/10/2017 59.6  37.0 - 80.0 % Final   • Lymphocyte Rel % 01/10/2017 27.7  10.0 - 50.0 % Final   • Monocyte Rel % 01/10/2017 11.1  0.0 - 12.0 % Final   • Eosinophil Rel % 01/10/2017 0.9  0.0 - 7.0 % Final   • Basophil Rel % 01/10/2017 0.7  0.0 - 2.0 % Final   • nRBC 01/10/2017 0   Final   • nRBC 01/10/2017 0   Final   ]

## 2017-12-27 RX ORDER — FLUOXETINE HYDROCHLORIDE 20 MG/1
60 CAPSULE ORAL
Qty: 36 CAPSULE | Refills: 0 | Status: SHIPPED | OUTPATIENT
Start: 2017-12-27 | End: 2018-01-08 | Stop reason: SDUPTHER

## 2017-12-27 NOTE — TELEPHONE ENCOUNTER
PATIENT NEEDS REFILL   PROZAC    PATIENT HAS APPT ON 1/8 BUT IS GOING TO RUN OUT OF MED BEFORE APPOINTMENT.

## 2018-01-02 ENCOUNTER — LAB (OUTPATIENT)
Dept: LAB | Facility: OTHER | Age: 63
End: 2018-01-02

## 2018-01-02 DIAGNOSIS — R73.9 HYPERGLYCEMIA: ICD-10-CM

## 2018-01-02 LAB
ALBUMIN SERPL-MCNC: 3.9 G/DL (ref 3.2–5.5)
ALBUMIN/GLOB SERPL: 1.3 G/DL (ref 1–3)
ALP SERPL-CCNC: 45 U/L (ref 15–121)
ALT SERPL W P-5'-P-CCNC: 34 U/L (ref 10–60)
ANION GAP SERPL CALCULATED.3IONS-SCNC: 11 MMOL/L (ref 5–15)
ARTICHOKE IGE QN: 111 MG/DL (ref 0–129)
AST SERPL-CCNC: 34 U/L (ref 10–60)
BILIRUB SERPL-MCNC: 0.8 MG/DL (ref 0.2–1)
BUN BLD-MCNC: 19 MG/DL (ref 8–25)
BUN/CREAT SERPL: 31.7 (ref 7–25)
CALCIUM SPEC-SCNC: 8.9 MG/DL (ref 8.4–10.8)
CHLORIDE SERPL-SCNC: 102 MMOL/L (ref 100–112)
CO2 SERPL-SCNC: 26 MMOL/L (ref 20–32)
CREAT BLD-MCNC: 0.6 MG/DL (ref 0.4–1.3)
GFR SERPL CREATININE-BSD FRML MDRD: 101 ML/MIN/1.73 (ref 45–104)
GLOBULIN UR ELPH-MCNC: 2.9 GM/DL (ref 2.5–4.6)
GLUCOSE BLD-MCNC: 105 MG/DL (ref 70–100)
HBA1C MFR BLD: 5.5 % (ref 4–5.6)
POTASSIUM BLD-SCNC: 4.1 MMOL/L (ref 3.4–5.4)
PROT SERPL-MCNC: 6.8 G/DL (ref 6.7–8.2)
SODIUM BLD-SCNC: 139 MMOL/L (ref 134–146)

## 2018-01-02 PROCEDURE — 36415 COLL VENOUS BLD VENIPUNCTURE: CPT | Performed by: FAMILY MEDICINE

## 2018-01-02 PROCEDURE — 80053 COMPREHEN METABOLIC PANEL: CPT | Performed by: FAMILY MEDICINE

## 2018-01-02 PROCEDURE — 83721 ASSAY OF BLOOD LIPOPROTEIN: CPT | Performed by: FAMILY MEDICINE

## 2018-01-02 PROCEDURE — 83036 HEMOGLOBIN GLYCOSYLATED A1C: CPT | Performed by: FAMILY MEDICINE

## 2018-01-08 ENCOUNTER — TELEPHONE (OUTPATIENT)
Dept: FAMILY MEDICINE CLINIC | Facility: CLINIC | Age: 63
End: 2018-01-08

## 2018-01-08 ENCOUNTER — LAB (OUTPATIENT)
Dept: LAB | Facility: OTHER | Age: 63
End: 2018-01-08

## 2018-01-08 ENCOUNTER — OFFICE VISIT (OUTPATIENT)
Dept: FAMILY MEDICINE CLINIC | Facility: CLINIC | Age: 63
End: 2018-01-08

## 2018-01-08 VITALS
OXYGEN SATURATION: 96 % | DIASTOLIC BLOOD PRESSURE: 70 MMHG | SYSTOLIC BLOOD PRESSURE: 118 MMHG | WEIGHT: 195 LBS | TEMPERATURE: 98.2 F | HEART RATE: 70 BPM | HEIGHT: 66 IN | BODY MASS INDEX: 31.34 KG/M2

## 2018-01-08 DIAGNOSIS — E78.01 FAMILIAL HYPERCHOLESTEROLEMIA: Primary | ICD-10-CM

## 2018-01-08 DIAGNOSIS — M25.50 ARTHRALGIA, UNSPECIFIED JOINT: ICD-10-CM

## 2018-01-08 DIAGNOSIS — K21.9 GASTROESOPHAGEAL REFLUX DISEASE WITHOUT ESOPHAGITIS: ICD-10-CM

## 2018-01-08 DIAGNOSIS — M25.50 ARTHRALGIA, UNSPECIFIED JOINT: Primary | ICD-10-CM

## 2018-01-08 DIAGNOSIS — F33.42 RECURRENT MAJOR DEPRESSIVE DISORDER, IN FULL REMISSION (HCC): ICD-10-CM

## 2018-01-08 DIAGNOSIS — F41.9 ANXIETY: ICD-10-CM

## 2018-01-08 DIAGNOSIS — R73.9 HYPERGLYCEMIA: ICD-10-CM

## 2018-01-08 LAB
DEPRECATED RDW RBC AUTO: 38.1 FL (ref 36.4–46.3)
ERYTHROCYTE [DISTWIDTH] IN BLOOD BY AUTOMATED COUNT: 12.6 % (ref 11.5–14.5)
ERYTHROCYTE [SEDIMENTATION RATE] IN BLOOD: 6 MM/HR (ref 0–20)
HCT VFR BLD AUTO: 38.6 % (ref 35–45)
HGB BLD-MCNC: 12.9 G/DL (ref 12–15.5)
MCH RBC QN AUTO: 28.5 PG (ref 26.5–34)
MCHC RBC AUTO-ENTMCNC: 33.4 G/DL (ref 31.4–36)
MCV RBC AUTO: 85.2 FL (ref 80–98)
PLATELET # BLD AUTO: 184 10*3/MM3 (ref 150–450)
PMV BLD AUTO: 10.4 FL (ref 8–12)
RBC # BLD AUTO: 4.53 10*6/MM3 (ref 3.77–5.16)
RHEUMATOID FACT SERPL-ACNC: NEGATIVE [IU]/ML
WBC NRBC COR # BLD: 3.13 10*3/MM3 (ref 3.2–9.8)

## 2018-01-08 PROCEDURE — 86141 C-REACTIVE PROTEIN HS: CPT | Performed by: FAMILY MEDICINE

## 2018-01-08 PROCEDURE — 99214 OFFICE O/P EST MOD 30 MIN: CPT | Performed by: FAMILY MEDICINE

## 2018-01-08 PROCEDURE — 85651 RBC SED RATE NONAUTOMATED: CPT | Performed by: FAMILY MEDICINE

## 2018-01-08 PROCEDURE — 86431 RHEUMATOID FACTOR QUANT: CPT | Performed by: FAMILY MEDICINE

## 2018-01-08 PROCEDURE — 36415 COLL VENOUS BLD VENIPUNCTURE: CPT | Performed by: FAMILY MEDICINE

## 2018-01-08 PROCEDURE — 85027 COMPLETE CBC AUTOMATED: CPT | Performed by: FAMILY MEDICINE

## 2018-01-08 PROCEDURE — 86038 ANTINUCLEAR ANTIBODIES: CPT | Performed by: FAMILY MEDICINE

## 2018-01-08 RX ORDER — FLUOXETINE HYDROCHLORIDE 20 MG/1
60 CAPSULE ORAL
Qty: 90 CAPSULE | Refills: 3 | Status: SHIPPED | OUTPATIENT
Start: 2018-01-08 | End: 2018-05-11 | Stop reason: SDUPTHER

## 2018-01-08 NOTE — PROGRESS NOTES
Subjective   Bertha Moncada is a 62 y.o. female.   Chief Complaint   Patient presents with   • Hyperlipidemia     6 mo f/u   • Results     labs   • Med Refill     prozac       History of Present Illness   Patient with history of hyperlipidemia, hyperglycemia, and depression is in today for reevaluation and to review labs.  Patient is requesting refill of her Prozac.  Patient has been complaining of increasing joint pains especially in the left wrist in the bilateral hips and knees.  She has noticed some swelling in the left wrist.  This has progressively gotten worse over the last 3-4 weeks    The following portions of the patient's history were reviewed and updated as appropriate: allergies, current medications, past family history, past medical history, past social history, past surgical history and problem list.    Review of Systems   Constitutional: Positive for fatigue.   HENT: Negative.    Eyes: Negative.    Respiratory: Negative.    Cardiovascular: Negative.    Gastrointestinal: Negative.    Endocrine: Negative.    Genitourinary: Negative.    Musculoskeletal: Positive for arthralgias, back pain and myalgias.   Skin: Negative.    Allergic/Immunologic: Negative.    Neurological: Negative.    Hematological: Negative.    Psychiatric/Behavioral: Positive for dysphoric mood. The patient is nervous/anxious.    All other systems reviewed and are negative.      Objective   Physical Exam   Constitutional: She is oriented to person, place, and time. She appears well-developed and well-nourished.   HENT:   Head: Normocephalic and atraumatic.   Right Ear: External ear normal.   Left Ear: External ear normal.   Nose: Nose normal.   Mouth/Throat: Oropharynx is clear and moist.   Eyes: Conjunctivae and EOM are normal. Pupils are equal, round, and reactive to light.   Neck: Normal range of motion. Neck supple.   Cardiovascular: Normal rate, regular rhythm, normal heart sounds and intact distal pulses.  Exam reveals no gallop and  no friction rub.    No murmur heard.  Pulmonary/Chest: Effort normal and breath sounds normal. She has no wheezes. She has no rales.   Abdominal: Soft. Bowel sounds are normal. She exhibits no mass. There is no tenderness. There is no rebound and no guarding.   obese   Musculoskeletal: Normal range of motion.        Left wrist: She exhibits tenderness and swelling.        Right hip: She exhibits tenderness.        Left hip: She exhibits tenderness.        Right knee: Tenderness found.        Cervical back: She exhibits tenderness.        Lumbar back: She exhibits tenderness.   Neurological: She is alert and oriented to person, place, and time. She has normal reflexes. No cranial nerve deficit. She exhibits normal muscle tone.   Skin: Skin is warm and dry. No rash noted.   Psychiatric: She has a normal mood and affect. Her behavior is normal. Judgment and thought content normal.   Nursing note and vitals reviewed.  Labs reviewed    Assessment/Plan   Bertha was seen today for hyperlipidemia, results and med refill.    Diagnoses and all orders for this visit:    Familial hypercholesterolemia    Hyperglycemia    Gastroesophageal reflux disease without esophagitis    Anxiety    Recurrent major depressive disorder, in full remission    Arthralgia, unspecified joint  -     ALLY; Future  -     CBC (No Diff); Future  -     High Sensitivity CRP; Future  -     Sedimentation Rate; Future  -     Rheumatoid Factor; Future  -     XR Wrist 3+ View Left; Future    Other orders  -     FLUoxetine (PROzac) 20 MG capsule; Take 3 capsules by mouth Daily Before Lunch.

## 2018-01-08 NOTE — TELEPHONE ENCOUNTER
XR Wrist 3+ View Left      CONCLUSION:           1. Mildly displaced transverse fracture of the ulnar styloid,        Called patient to inform her she has a fracture wrist and is to see a ortho which the referral has been put in and she is to wear a wrist splint which [facility]

## 2018-01-10 ENCOUNTER — OFFICE VISIT (OUTPATIENT)
Dept: FAMILY MEDICINE CLINIC | Facility: CLINIC | Age: 63
End: 2018-01-10

## 2018-01-10 VITALS
SYSTOLIC BLOOD PRESSURE: 116 MMHG | DIASTOLIC BLOOD PRESSURE: 68 MMHG | TEMPERATURE: 98.1 F | BODY MASS INDEX: 31.34 KG/M2 | HEART RATE: 70 BPM | OXYGEN SATURATION: 96 % | RESPIRATION RATE: 16 BRPM | HEIGHT: 66 IN | WEIGHT: 195 LBS

## 2018-01-10 DIAGNOSIS — E55.9 VITAMIN D DEFICIENCY: ICD-10-CM

## 2018-01-10 DIAGNOSIS — S62.102A CLOSED FRACTURE OF LEFT WRIST, INITIAL ENCOUNTER: Primary | ICD-10-CM

## 2018-01-10 LAB
25(OH)D3 SERPL-MCNC: 37.7 NG/ML (ref 30–100)
ANA SER QL: NEGATIVE
CRP SERPL HS-MCNC: 0.54 MG/L (ref 0–3)

## 2018-01-10 PROCEDURE — 99214 OFFICE O/P EST MOD 30 MIN: CPT | Performed by: FAMILY MEDICINE

## 2018-01-10 PROCEDURE — 82306 VITAMIN D 25 HYDROXY: CPT | Performed by: FAMILY MEDICINE

## 2018-01-10 PROCEDURE — 36415 COLL VENOUS BLD VENIPUNCTURE: CPT | Performed by: FAMILY MEDICINE

## 2018-01-10 RX ORDER — CLONAZEPAM 0.5 MG/1
0.5 TABLET ORAL 2 TIMES DAILY PRN
Qty: 60 TABLET | Refills: 2 | Status: SHIPPED | OUTPATIENT
Start: 2018-01-10 | End: 2018-03-07

## 2018-01-10 NOTE — PROGRESS NOTES
Subjective   Bertha Moncada is a 62 y.o. female.   Chief Complaint   Patient presents with   • Insomnia   • Leg Pain       History of Present Illness   Patient is in today for reevaluation of her arthralgias.  She still complaining of a new discomfort in the bilateral thighs.  She is not resting well at night.  X-ray of the left wrist revealed mildly displaced fracture of the styloid of the ulna.      The following portions of the patient's history were reviewed and updated as appropriate: allergies, current medications, past family history, past medical history, past social history, past surgical history and problem list.    Review of Systems   Constitutional: Positive for fatigue.   HENT: Negative.    Eyes: Negative.    Respiratory: Negative.    Cardiovascular: Negative.    Gastrointestinal: Negative.    Endocrine: Negative.    Genitourinary: Negative.    Musculoskeletal: Positive for arthralgias, back pain and myalgias.   Skin: Negative.    Allergic/Immunologic: Negative.    Neurological: Negative.    Hematological: Negative.    Psychiatric/Behavioral: Positive for dysphoric mood. The patient is nervous/anxious.    All other systems reviewed and are negative.      Objective   Physical Exam   Constitutional: She is oriented to person, place, and time. She appears well-developed and well-nourished.   HENT:   Head: Normocephalic and atraumatic.   Right Ear: External ear normal.   Left Ear: External ear normal.   Nose: Nose normal.   Mouth/Throat: Oropharynx is clear and moist.   Eyes: Conjunctivae and EOM are normal. Pupils are equal, round, and reactive to light.   Neck: Normal range of motion. Neck supple.   Cardiovascular: Normal rate, regular rhythm, normal heart sounds and intact distal pulses.  Exam reveals no gallop and no friction rub.    No murmur heard.  Pulmonary/Chest: Effort normal and breath sounds normal. She has no wheezes. She has no rales.   Abdominal: Soft. Bowel sounds are normal. She exhibits no  mass. There is no tenderness. There is no rebound and no guarding.   obese   Musculoskeletal: Normal range of motion.        Left wrist: She exhibits tenderness and swelling.        Right hip: She exhibits tenderness.        Left hip: She exhibits tenderness.        Right knee: Tenderness found.        Cervical back: She exhibits tenderness.        Lumbar back: She exhibits tenderness.   Neurological: She is alert and oriented to person, place, and time. She has normal reflexes. No cranial nerve deficit. She exhibits normal muscle tone.   Skin: Skin is warm and dry. No rash noted.   Psychiatric: She has a normal mood and affect. Her behavior is normal. Judgment and thought content normal.   Nursing note and vitals reviewed.   labs reviewed.  Jorge reviewed.    Assessment/Plan   Bertha was seen today for insomnia and leg pain.    Diagnoses and all orders for this visit:    Closed fracture of left wrist, initial encounter    Vitamin D deficiency  -     Vitamin D 25 Hydroxy    Other orders  -     clonazePAM (KLONOPIN) 0.5 MG tablet; Take 1 tablet by mouth 2 (Two) Times a Day As Needed for Anxiety (restless legs).          Patient is been placed in a left wrist splint and referral to orthopedics done

## 2018-01-15 ENCOUNTER — OFFICE VISIT (OUTPATIENT)
Dept: ORTHOPEDIC SURGERY | Facility: CLINIC | Age: 63
End: 2018-01-15

## 2018-01-15 VITALS — BODY MASS INDEX: 31.37 KG/M2 | WEIGHT: 195.2 LBS | HEIGHT: 66 IN

## 2018-01-15 DIAGNOSIS — R52 PAIN: Primary | ICD-10-CM

## 2018-01-15 DIAGNOSIS — S52.612A CLOSED DISPLACED FRACTURE OF STYLOID PROCESS OF LEFT ULNA, INITIAL ENCOUNTER: Primary | ICD-10-CM

## 2018-01-15 DIAGNOSIS — M25.532 LEFT WRIST PAIN: ICD-10-CM

## 2018-01-15 PROCEDURE — 25650 CLTX ULNAR STYLOID FRACTURE: CPT | Performed by: NURSE PRACTITIONER

## 2018-01-15 PROCEDURE — 99214 OFFICE O/P EST MOD 30 MIN: CPT | Performed by: NURSE PRACTITIONER

## 2018-01-15 NOTE — PROGRESS NOTES
Bertha Moncada is a 62 y.o. female   Primary provider:  Matthew Saldana MD       Chief Complaint   Patient presents with   • Left Wrist - Establish Care     Xray 1/8/18       HISTORY OF PRESENT ILLNESS:    Wrist Injury    Incident onset: 12/7/17. The injury mechanism was a fall. The pain is present in the left wrist. The quality of the pain is described as aching. The pain is at a severity of 6/10. The pain is moderate. The pain has been intermittent since the incident. Associated symptoms comments: Swelling and stiffness  . The symptoms are aggravated by movement, lifting and palpation. She has tried nothing for the symptoms.        CONCURRENT MEDICAL HISTORY:    Past Medical History:   Diagnosis Date   • Abdominal pain    • Acute bronchiolitis    • Adverse drug interaction    • Anxiety state    • Atopic dermatitis    • Backache    • Benign essential hypertension    • Benign hypertension    • Cough    • Depressive disorder    • Dysfunction of eustachian tube    • Easy bruising    • Family history of cancer    • Fatigue    • Hyperglycemia    • Hyperlipidemia    • Indigestion    • Knee pain    • Lateral epicondylitis    • Malaise and fatigue    • Obesity    • MADHAV on CPAP    • Osteoarthritis        Allergies   Allergen Reactions   • Amoxicillin          Current Outpatient Prescriptions:   •  atorvastatin (LIPITOR) 10 MG tablet, TAKE 1 TABLET BY MOUTH DAILY AT BEDTIME, Disp: 30 tablet, Rfl: 5  •  clonazePAM (KLONOPIN) 0.5 MG tablet, Take 1 tablet by mouth 2 (Two) Times a Day As Needed for Anxiety (restless legs)., Disp: 60 tablet, Rfl: 2  •  FLUoxetine (PROzac) 20 MG capsule, Take 3 capsules by mouth Daily Before Lunch., Disp: 90 capsule, Rfl: 3  •  Multiple Vitamins-Minerals (MULTIVITAMIN PO), Multivit-min-FA-Ca carb-vit K 400 mcg-500 mg calcium-20 mcg tablet Take 1 tablet by mouth daily, Disp: , Rfl:   •  omeprazole (priLOSEC) 20 MG capsule, Take 1 capsule by mouth 2 (Two) Times a Day., Disp: 180 capsule, Rfl: 2  •   "traZODone (DESYREL) 50 MG tablet, Take 1 tablet by mouth Every Night., Disp: 30 tablet, Rfl: 6    Past Surgical History:   Procedure Laterality Date   • INJECTION OF MEDICATION  11/06/2013    Depo Medrol (Methylprednisone) 80mg (2)   • INJECTION OF MEDICATION  04/01/2015    Dexamethasone (1)       Family History   Problem Relation Age of Onset   • Cancer Other      Breast Cancer; Ovarian Cancer   • Hypertension Other    • Cancer Mother    • Ovarian cancer Mother    • Thyroid disease Mother    • Heart disease Father    • Alcohol abuse Father    • Diabetes Sister         Social History     Social History   • Marital status:      Spouse name: Matthew    • Number of children: N/A   • Years of education: N/A     Occupational History   • Not on file.     Social History Main Topics   • Smoking status: Never Smoker   • Smokeless tobacco: Never Used   • Alcohol use No   • Drug use: No   • Sexual activity: Yes     Partners: Male     Other Topics Concern   • Not on file     Social History Narrative        Review of Systems   Eyes: Positive for pain.   Musculoskeletal: Positive for arthralgias and joint swelling.        Wrist pain and swelling     Psychiatric/Behavioral: Positive for sleep disturbance.   All other systems reviewed and are negative.      PHYSICAL EXAMINATION:       Ht 167.6 cm (66\")  Wt 88.5 kg (195 lb 3.2 oz)  BMI 31.51 kg/m2    Physical Exam   Constitutional: She is oriented to person, place, and time. Vital signs are normal. She appears well-developed and well-nourished. She is cooperative.   HENT:   Head: Normocephalic and atraumatic.   Neck: Trachea normal and phonation normal.   Pulmonary/Chest: Effort normal. No respiratory distress.   Abdominal: Soft. Normal appearance. She exhibits no distension.   Neurological: She is alert and oriented to person, place, and time. GCS eye subscore is 4. GCS verbal subscore is 5. GCS motor subscore is 6.   Skin: Skin is warm, dry and intact.   Psychiatric: She " has a normal mood and affect. Her speech is normal and behavior is normal. Judgment and thought content normal. Cognition and memory are normal.   Vitals reviewed.      GAIT:     [x]  Normal  []  Antalgic    Assistive device: [x]  None  []  Walker     []  Crutches  []  Cane     []  Wheelchair  []  Stretcher    Right Hand Exam   Right hand exam is normal.      Left Hand Exam     Tenderness   The patient is experiencing tenderness in the ulnar area.     Range of Motion     Wrist   Extension: abnormal   Flexion: abnormal   Pronation: abnormal   Supination: abnormal     Muscle Strength   Wrist Extension: 4/5   Wrist Flexion: 4/5   :  4/5     Other   Erythema: absent  Scars: absent  Sensation: normal  Pulse: present              Xr Wrist 3+ View Left    Result Date: 1/8/2018  Narrative: EXAM:  Radiograph(s), Osseous       REGION:   Wrist  SIDE:     Left   VIEWS:   3         INDICATION:    Medial pain and swelling, M25.50 Pain in unspecified joint   COMPARISON:    none          FINDINGS:       There is a mildly displaced fracture involving the ulnar styloid. No other fracture is visualized. Otherwise, normal osseous alignment. .        Impression: CONCLUSION:       1. Mildly displaced transverse fracture of the ulnar styloid, presumably acute.                   Electronically signed by:  NICHOL Winchester MD  1/8/2018 2:31 PM CST Workstation: [id number]          ASSESSMENT:    Diagnoses and all orders for this visit:    Closed displaced fracture of styloid process of left ulna, initial encounter    Left wrist pain          PLAN  Small avulsion injury off the tip of the distal ulna.  Recommend splinting with a wrist splint which was applied today in office follow-up in 4 weeks for repeat x-rays.  No Follow-up on file.    Darryl Elkins, DILLAN

## 2018-02-12 DIAGNOSIS — S52.612A CLOSED DISPLACED FRACTURE OF STYLOID PROCESS OF LEFT ULNA, INITIAL ENCOUNTER: Primary | ICD-10-CM

## 2018-02-13 ENCOUNTER — OFFICE VISIT (OUTPATIENT)
Dept: ORTHOPEDIC SURGERY | Facility: CLINIC | Age: 63
End: 2018-02-13

## 2018-02-13 VITALS — WEIGHT: 198 LBS | BODY MASS INDEX: 31.82 KG/M2 | HEIGHT: 66 IN

## 2018-02-13 DIAGNOSIS — S52.612D CLOSED DISPLACED FRACTURE OF STYLOID PROCESS OF LEFT ULNA WITH ROUTINE HEALING, SUBSEQUENT ENCOUNTER: Primary | ICD-10-CM

## 2018-02-13 PROCEDURE — 99024 POSTOP FOLLOW-UP VISIT: CPT | Performed by: NURSE PRACTITIONER

## 2018-02-13 NOTE — PROGRESS NOTES
Bertha Moncada is a 62 y.o. female returns for     Chief Complaint   Patient presents with   • Left Wrist - Follow-up     Repeat xray done today in office.        HISTORY OF PRESENT ILLNESS:     62-year-old  female in the office today for follow-up of the avulsion injury off the ulnar styloid.  He reports that pain overall has improved since previous evaluation.     CONCURRENT MEDICAL HISTORY:    Past Medical History:   Diagnosis Date   • Abdominal pain    • Acute bronchiolitis    • Adverse drug interaction    • Anxiety state    • Atopic dermatitis    • Backache    • Benign essential hypertension    • Benign hypertension    • Cough    • Depressive disorder    • Dysfunction of eustachian tube    • Easy bruising    • Family history of cancer    • Fatigue    • Hyperglycemia    • Hyperlipidemia    • Indigestion    • Knee pain    • Lateral epicondylitis    • Malaise and fatigue    • Obesity    • MADHAV on CPAP    • Osteoarthritis        Allergies   Allergen Reactions   • Amoxicillin          Current Outpatient Prescriptions:   •  atorvastatin (LIPITOR) 10 MG tablet, TAKE 1 TABLET BY MOUTH DAILY AT BEDTIME, Disp: 30 tablet, Rfl: 5  •  clonazePAM (KLONOPIN) 0.5 MG tablet, Take 1 tablet by mouth 2 (Two) Times a Day As Needed for Anxiety (restless legs)., Disp: 60 tablet, Rfl: 2  •  FLUoxetine (PROzac) 20 MG capsule, Take 3 capsules by mouth Daily Before Lunch., Disp: 90 capsule, Rfl: 3  •  Multiple Vitamins-Minerals (MULTIVITAMIN PO), Multivit-min-FA-Ca carb-vit K 400 mcg-500 mg calcium-20 mcg tablet Take 1 tablet by mouth daily, Disp: , Rfl:   •  omeprazole (priLOSEC) 20 MG capsule, Take 1 capsule by mouth 2 (Two) Times a Day., Disp: 180 capsule, Rfl: 2  •  traZODone (DESYREL) 50 MG tablet, Take 1 tablet by mouth Every Night., Disp: 30 tablet, Rfl: 6    Past Surgical History:   Procedure Laterality Date   • INJECTION OF MEDICATION  11/06/2013    Depo Medrol (Methylprednisone) 80mg (2)   • INJECTION OF MEDICATION   "04/01/2015    Dexamethasone (1)       ROS  No fevers or chills.  No chest pain or shortness of air.  No GI or  disturbances.    PHYSICAL EXAMINATION:       Ht 167.6 cm (66\")  Wt 89.8 kg (198 lb)  BMI 31.96 kg/m2    Physical Exam   Constitutional: She is oriented to person, place, and time. Vital signs are normal. She appears well-developed and well-nourished. She is cooperative.   HENT:   Head: Normocephalic and atraumatic.   Neck: Trachea normal and phonation normal.   Pulmonary/Chest: Effort normal. No respiratory distress.   Abdominal: Soft. Normal appearance. She exhibits no distension.   Neurological: She is alert and oriented to person, place, and time. GCS eye subscore is 4. GCS verbal subscore is 5. GCS motor subscore is 6.   Skin: Skin is warm, dry and intact.   Psychiatric: She has a normal mood and affect. Her speech is normal and behavior is normal. Judgment and thought content normal. Cognition and memory are normal.   Vitals reviewed.      GAIT:     [x]  Normal  []  Antalgic    Assistive device: [x]  None  []  Walker     []  Crutches  []  Cane     []  Wheelchair  []  Stretcher    Right Hand Exam   Right hand exam is normal.      Left Hand Exam     Tenderness   Left hand tenderness location: trace of tenderness.     Range of Motion     Wrist   Extension: normal   Flexion: normal   Pronation: normal   Supination: normal     Muscle Strength   Wrist Extension: 5/5   Wrist Flexion: 5/5   :  4/5     Other   Erythema: absent  Scars: absent  Sensation: normal  Pulse: present              No results found.          ASSESSMENT:    Diagnoses and all orders for this visit:    Closed displaced fracture of styloid process of left ulna with routine healing, subsequent encounter  -     Cancel: MRI Wrist Left Without Contrast; Future          PLAN  Pains improved overall but recommend continue progression of range of motion and activity as tolerated based on pain follow-up in 6 weeks for recheck.  No Follow-up " on file.    Darryl Elkins, APRN

## 2018-03-07 ENCOUNTER — OFFICE VISIT (OUTPATIENT)
Dept: FAMILY MEDICINE CLINIC | Facility: CLINIC | Age: 63
End: 2018-03-07

## 2018-03-07 VITALS
OXYGEN SATURATION: 95 % | WEIGHT: 193 LBS | SYSTOLIC BLOOD PRESSURE: 112 MMHG | DIASTOLIC BLOOD PRESSURE: 68 MMHG | HEIGHT: 66 IN | TEMPERATURE: 98.3 F | HEART RATE: 74 BPM | RESPIRATION RATE: 18 BRPM | BODY MASS INDEX: 31.02 KG/M2

## 2018-03-07 DIAGNOSIS — F33.42 RECURRENT MAJOR DEPRESSIVE DISORDER, IN FULL REMISSION (HCC): Primary | ICD-10-CM

## 2018-03-07 DIAGNOSIS — F41.9 ANXIETY: ICD-10-CM

## 2018-03-07 PROCEDURE — 99213 OFFICE O/P EST LOW 20 MIN: CPT | Performed by: FAMILY MEDICINE

## 2018-03-07 RX ORDER — ALPRAZOLAM 0.5 MG/1
0.5 TABLET ORAL 3 TIMES DAILY PRN
Qty: 90 TABLET | Refills: 2 | Status: SHIPPED | OUTPATIENT
Start: 2018-03-07

## 2018-03-07 NOTE — PROGRESS NOTES
Subjective   Bertha Moncada is a 62 y.o. female.   Chief Complaint   Patient presents with   • Anxiety     wanting back on xanax       Anxiety   Presents for follow-up visit. Symptoms include excessive worry, insomnia and nervous/anxious behavior. Patient reports no chest pain, decreased concentration, depressed mood or panic. Symptoms occur most days. The severity of symptoms is causing significant distress. The patient sleeps 5 hours per night. The quality of sleep is poor.     Treatment side effects: none.      Klonopin not helping.    The following portions of the patient's history were reviewed and updated as appropriate: allergies, current medications, past family history, past medical history, past social history, past surgical history and problem list.    Review of Systems   Constitutional: Negative.    HENT: Negative.    Eyes: Negative.    Respiratory: Negative.    Cardiovascular: Negative.  Negative for chest pain.   Gastrointestinal: Negative.    Endocrine: Negative.    Genitourinary: Negative.    Musculoskeletal: Negative.    Skin: Negative.    Allergic/Immunologic: Negative.    Neurological: Negative.    Hematological: Negative.    Psychiatric/Behavioral: Positive for sleep disturbance. Negative for decreased concentration. The patient is nervous/anxious and has insomnia.    All other systems reviewed and are negative.      Objective   Physical Exam   Constitutional: She is oriented to person, place, and time. She appears well-developed and well-nourished.   HENT:   Head: Normocephalic and atraumatic.   Right Ear: External ear normal.   Left Ear: External ear normal.   Nose: Nose normal.   Mouth/Throat: Oropharynx is clear and moist.   Eyes: Conjunctivae and EOM are normal. Pupils are equal, round, and reactive to light.   Neck: Normal range of motion. Neck supple.   Cardiovascular: Normal rate, regular rhythm, normal heart sounds and intact distal pulses.  Exam reveals no gallop and no friction rub.    No  murmur heard.  Pulmonary/Chest: Effort normal and breath sounds normal. She has no wheezes. She has no rales.   Abdominal: Soft. Bowel sounds are normal. She exhibits no mass. There is no tenderness. There is no rebound and no guarding.   obese   Musculoskeletal: Normal range of motion.        Left wrist: She exhibits tenderness and swelling.        Right hip: She exhibits tenderness.        Left hip: She exhibits tenderness.        Right knee: Tenderness found.        Cervical back: She exhibits tenderness.        Lumbar back: She exhibits tenderness.   Neurological: She is alert and oriented to person, place, and time. She has normal reflexes. No cranial nerve deficit. She exhibits normal muscle tone.   Skin: Skin is warm and dry. No rash noted.   Psychiatric: She has a normal mood and affect. Her behavior is normal. Judgment and thought content normal.   Nursing note and vitals reviewed.  Jorge reviewed    Assessment/Plan   Bertha was seen today for anxiety.    Diagnoses and all orders for this visit:    Recurrent major depressive disorder, in full remission    Anxiety    Other orders  -     ALPRAZolam (XANAX) 0.5 MG tablet; Take 1 tablet by mouth 3 (Three) Times a Day As Needed for Anxiety or Sleep.

## 2018-04-05 ENCOUNTER — OFFICE VISIT (OUTPATIENT)
Dept: FAMILY MEDICINE CLINIC | Facility: CLINIC | Age: 63
End: 2018-04-05

## 2018-04-05 VITALS
HEIGHT: 66 IN | HEART RATE: 74 BPM | BODY MASS INDEX: 31.02 KG/M2 | TEMPERATURE: 98.5 F | RESPIRATION RATE: 20 BRPM | WEIGHT: 193 LBS | DIASTOLIC BLOOD PRESSURE: 62 MMHG | OXYGEN SATURATION: 97 % | SYSTOLIC BLOOD PRESSURE: 100 MMHG

## 2018-04-05 DIAGNOSIS — J02.9 ACUTE PHARYNGITIS, UNSPECIFIED ETIOLOGY: ICD-10-CM

## 2018-04-05 DIAGNOSIS — J03.90 TONSILLITIS: Primary | ICD-10-CM

## 2018-04-05 DIAGNOSIS — H60.501 ACUTE OTITIS EXTERNA OF RIGHT EAR, UNSPECIFIED TYPE: ICD-10-CM

## 2018-04-05 DIAGNOSIS — J06.9 ACUTE URI: ICD-10-CM

## 2018-04-05 LAB
EXPIRATION DATE: NORMAL
INTERNAL CONTROL: NORMAL
Lab: NORMAL
S PYO AG THROAT QL: NEGATIVE

## 2018-04-05 PROCEDURE — 87880 STREP A ASSAY W/OPTIC: CPT | Performed by: NURSE PRACTITIONER

## 2018-04-05 PROCEDURE — 96372 THER/PROPH/DIAG INJ SC/IM: CPT | Performed by: NURSE PRACTITIONER

## 2018-04-05 PROCEDURE — 99214 OFFICE O/P EST MOD 30 MIN: CPT | Performed by: NURSE PRACTITIONER

## 2018-04-05 RX ORDER — CEFUROXIME AXETIL 500 MG/1
500 TABLET ORAL 2 TIMES DAILY
Qty: 20 TABLET | Refills: 0 | Status: SHIPPED | OUTPATIENT
Start: 2018-04-05 | End: 2018-04-15

## 2018-04-05 RX ORDER — CIPROFLOXACIN AND DEXAMETHASONE 3; 1 MG/ML; MG/ML
4 SUSPENSION/ DROPS AURICULAR (OTIC) 2 TIMES DAILY
Qty: 1 EACH | Refills: 0 | Status: SHIPPED | OUTPATIENT
Start: 2018-04-05 | End: 2018-04-11 | Stop reason: SDUPTHER

## 2018-04-05 RX ORDER — CEFTRIAXONE 1 G/1
1 INJECTION, POWDER, FOR SOLUTION INTRAMUSCULAR; INTRAVENOUS ONCE
Status: COMPLETED | OUTPATIENT
Start: 2018-04-05 | End: 2018-04-05

## 2018-04-05 RX ORDER — METHYLPREDNISOLONE ACETATE 80 MG/ML
80 INJECTION, SUSPENSION INTRA-ARTICULAR; INTRALESIONAL; INTRAMUSCULAR; SOFT TISSUE ONCE
Status: COMPLETED | OUTPATIENT
Start: 2018-04-05 | End: 2018-04-05

## 2018-04-05 RX ADMIN — METHYLPREDNISOLONE ACETATE 80 MG: 80 INJECTION, SUSPENSION INTRA-ARTICULAR; INTRALESIONAL; INTRAMUSCULAR; SOFT TISSUE at 14:01

## 2018-04-05 RX ADMIN — CEFTRIAXONE 1 G: 1 INJECTION, POWDER, FOR SOLUTION INTRAMUSCULAR; INTRAVENOUS at 14:00

## 2018-04-05 NOTE — PROGRESS NOTES
"Chief Complaint   Patient presents with   • Earache     pt states \" drainage causing headache and left sided throat pain x2days       Subjective   Bertha Moncada is a 62 y.o. female presents to the office for evaluation of left earache X 2 days.    PMH  Hyperlipidemia- stable; well controlled with atorvastatin    Depression- stable; well controlled with prozac    Anxiety- stable; well controlled with prn xanax    GERD- stable; well controlled with prilosec    HPI   Patient presents with a 2 day history of left earache. Pain began suddenly. Associated symptoms include mild throat discomfort, post-nasal drip, and headache. She has taken excedrin with mild relief of headache pain. She has also taking nasocort daily with no relief of symptoms. No fever. No body aches.       Earache    There is pain in the left ear. This is a new problem. The current episode started in the past 7 days. The problem occurs constantly. The problem has been gradually worsening. There has been no fever. The pain is at a severity of 2/10. The pain is mild. Associated symptoms include a sore throat. Pertinent negatives include no abdominal pain, coughing, diarrhea, ear discharge, headaches, hearing loss, neck pain, rash, rhinorrhea or vomiting. She has tried nothing for the symptoms. The treatment provided no relief. There is no history of a chronic ear infection, hearing loss or a tympanostomy tube.     Family History   Problem Relation Age of Onset   • Cancer Other      Breast Cancer; Ovarian Cancer   • Hypertension Other    • Cancer Mother    • Ovarian cancer Mother    • Thyroid disease Mother    • Heart disease Father    • Alcohol abuse Father    • Diabetes Sister      Social History     Social History   • Marital status:      Spouse name: Matthew    • Number of children: N/A   • Years of education: N/A     Occupational History   • Not on file.     Social History Main Topics   • Smoking status: Never Smoker   • Smokeless tobacco: Never " "Used   • Alcohol use No   • Drug use: No   • Sexual activity: Yes     Partners: Male     Other Topics Concern   • Not on file     Social History Narrative   • No narrative on file       The following portions of the patient's history were reviewed and updated as appropriate: allergies, current medications, past family history, past medical history, past social history, past surgical history and problem list.    Review of Systems   Constitutional: Negative.  Negative for activity change, chills, fatigue, fever and unexpected weight change.   HENT: Positive for ear pain, postnasal drip, sinus pressure and sore throat. Negative for congestion, ear discharge, hearing loss and rhinorrhea.    Eyes: Negative.  Negative for pain and redness.   Respiratory: Negative.  Negative for cough, choking, chest tightness, shortness of breath and wheezing.    Cardiovascular: Negative.  Negative for chest pain, palpitations and leg swelling.   Gastrointestinal: Negative.  Negative for abdominal distention, abdominal pain, constipation, diarrhea, nausea, rectal pain and vomiting.   Endocrine: Negative.    Genitourinary: Negative.  Negative for decreased urine volume, difficulty urinating, dysuria, flank pain, hematuria and urgency.   Musculoskeletal: Negative.  Negative for gait problem and neck pain.   Skin: Negative.  Negative for rash and wound.   Allergic/Immunologic: Negative.    Neurological: Negative.  Negative for dizziness, syncope, weakness, light-headedness, numbness and headaches.   Hematological: Negative.    Psychiatric/Behavioral: Negative.  Negative for agitation, behavioral problems, confusion, self-injury, sleep disturbance and suicidal ideas. The patient is not nervous/anxious.      14 Point ROS completed with pertinent positives discussed. All other systems reviewed and are negative.       Objective   Encounter Vitals  /62   Pulse 74   Temp 98.5 °F (36.9 °C)   Resp 20   Ht 167.6 cm (66\")   Wt 87.5 kg (193 " "lb)   SpO2 97%   BMI 31.15 kg/m²   Vitals:    04/05/18 1313   BP: 100/62   Pulse: 74   Resp: 20   Temp: 98.5 °F (36.9 °C)   SpO2: 97%   Weight: 87.5 kg (193 lb)   Height: 167.6 cm (66\")       Physical Exam   Constitutional: She is oriented to person, place, and time. Vital signs are normal. She appears well-developed and well-nourished. She does not appear ill.   HENT:   Head: Normocephalic and atraumatic.   Right Ear: External ear and ear canal normal. There is swelling. Tympanic membrane is erythematous. A middle ear effusion is present.   Left Ear: Hearing, tympanic membrane, external ear and ear canal normal.   Nose: Right sinus exhibits maxillary sinus tenderness. Right sinus exhibits no frontal sinus tenderness. Left sinus exhibits maxillary sinus tenderness and frontal sinus tenderness.   Mouth/Throat: Uvula is midline, oropharynx is clear and moist and mucous membranes are normal. No posterior oropharyngeal edema or posterior oropharyngeal erythema. Tonsillar exudate.       Eyes: Lids are normal. Pupils are equal, round, and reactive to light.   Neck: Trachea normal and normal range of motion. Neck supple. No thyroid mass present.   Cardiovascular: Normal rate, regular rhythm, S1 normal, S2 normal, normal heart sounds and intact distal pulses.  Exam reveals no gallop and no friction rub.    No murmur heard.  Pulmonary/Chest: Effort normal and breath sounds normal. No respiratory distress. She has no wheezes. She has no rales.   Abdominal: Soft. Normal appearance and bowel sounds are normal. She exhibits no mass. There is no rebound and no guarding.   Musculoskeletal: Normal range of motion.   Lymphadenopathy:     She has cervical adenopathy.        Right cervical: Superficial cervical adenopathy present.        Left cervical: Superficial cervical adenopathy present.   Neurological: She is alert and oriented to person, place, and time. She has normal strength. No cranial nerve deficit or sensory deficit. " Gait normal.   Skin: Skin is warm and dry. No rash noted.   Psychiatric: She has a normal mood and affect. Her speech is normal and behavior is normal. Judgment and thought content normal. Cognition and memory are normal.   Nursing note and vitals reviewed.      Pertinent Labs  Office Visit on 04/05/2018   Component Date Value Ref Range Status   • Rapid Strep A Screen 04/05/2018 Negative  Negative, VALID, INVALID, Not Performed Final   • Internal Control 04/05/2018 Passed  Passed Final   • Lot Number 04/05/2018 [id number]   Final   • Expiration Date 04/05/2018 2/19   Final   Office Visit on 01/10/2018   Component Date Value Ref Range Status   • 25 Hydroxy, Vitamin D 01/10/2018 37.7  30.0 - 100.0 ng/ml Final   Lab on 01/08/2018   Component Date Value Ref Range Status   • ALLY Direct 01/10/2018 Negative  Negative Final   • WBC 01/08/2018 3.13* 3.20 - 9.80 10*3/mm3 Final   • RBC 01/08/2018 4.53  3.77 - 5.16 10*6/mm3 Final   • Hemoglobin 01/08/2018 12.9  12.0 - 15.5 g/dL Final   • Hematocrit 01/08/2018 38.6  35.0 - 45.0 % Final   • MCV 01/08/2018 85.2  80.0 - 98.0 fL Final   • MCH 01/08/2018 28.5  26.5 - 34.0 pg Final   • MCHC 01/08/2018 33.4  31.4 - 36.0 g/dL Final   • RDW 01/08/2018 12.6  11.5 - 14.5 % Final   • RDW-SD 01/08/2018 38.1  36.4 - 46.3 fl Final   • MPV 01/08/2018 10.4  8.0 - 12.0 fL Final   • Platelets 01/08/2018 184  150 - 450 10*3/mm3 Final   • CRP, High Sensitivity 01/10/2018 0.54  0.00 - 3.00 mg/L Final   • Sed Rate 01/08/2018 6  0 - 20 mm/hr Final   • Rheumatoid Factor Qualitative 01/08/2018 Negative  Negative Final     Labs have been independently reviewed    Key Imaging/Tracings/POC Testing  Strep negative    Assessment and Medications  Problems Addressed this Visit     None      Visit Diagnoses     Tonsillitis    -  Primary    Relevant Medications    cefuroxime (CEFTIN) 500 MG tablet    Acute pharyngitis, unspecified etiology        Relevant Medications    cefTRIAXone (ROCEPHIN) injection 1 g  (Completed) (Start on 4/5/2018  2:15 PM)    methylPREDNISolone acetate (DEPO-medrol) injection 80 mg (Completed) (Start on 4/5/2018  2:15 PM)    cefuroxime (CEFTIN) 500 MG tablet    Other Relevant Orders    POCT rapid strep A (Completed)    Acute URI        Relevant Medications    cefuroxime (CEFTIN) 500 MG tablet    Acute otitis externa of right ear, unspecified type        Relevant Medications    ciprofloxacin-dexamethasone (CIPRODEX) 0.3-0.1 % otic suspension        Side effects of ordered medications discussed with patient.     Plan/Additional Notes/Instructions  Plan   1. Strep negative  2. Will treat for tonsillitis with rocephin, steroid inj, and PO ceftin  3. Will treat for right OE with cirpodex gtts  4. tyelnol and IBu for pain and fever if needed  5.  warm salt water or listerine gargles  6. Increase PO fluid intake  7. RTC if no better after completion of above ordered POC, or if symptoms worsen.  8. If you experience respiratory distress, chest pain, confusion, syncope, lethargy, or feelings of impending doom, call 911 or have someone drive you to the nearest ER.    Follow-Up  Return if symptoms worsen or fail to improve.    Patient/caregiver verbalizes understanding of all orders and instructions in this plan of care.           This document has been electronically signed by DILLAN Figueroa on April 5, 2018 2:13 PM

## 2018-04-11 DIAGNOSIS — H60.501 ACUTE OTITIS EXTERNA OF RIGHT EAR, UNSPECIFIED TYPE: ICD-10-CM

## 2018-04-11 RX ORDER — CIPROFLOXACIN AND DEXAMETHASONE 3; 1 MG/ML; MG/ML
4 SUSPENSION/ DROPS AURICULAR (OTIC) 2 TIMES DAILY
Qty: 1 EACH | Refills: 0 | Status: SHIPPED | OUTPATIENT
Start: 2018-04-11 | End: 2018-04-18

## 2018-05-03 ENCOUNTER — OFFICE VISIT (OUTPATIENT)
Dept: FAMILY MEDICINE CLINIC | Facility: CLINIC | Age: 63
End: 2018-05-03

## 2018-05-03 VITALS
DIASTOLIC BLOOD PRESSURE: 78 MMHG | RESPIRATION RATE: 22 BRPM | BODY MASS INDEX: 31.02 KG/M2 | HEART RATE: 76 BPM | HEIGHT: 66 IN | TEMPERATURE: 98.3 F | WEIGHT: 193 LBS | OXYGEN SATURATION: 97 % | SYSTOLIC BLOOD PRESSURE: 110 MMHG

## 2018-05-03 DIAGNOSIS — R05.9 COUGH: ICD-10-CM

## 2018-05-03 DIAGNOSIS — J40 BRONCHITIS: ICD-10-CM

## 2018-05-03 DIAGNOSIS — R06.2 WHEEZING: ICD-10-CM

## 2018-05-03 DIAGNOSIS — J02.9 PHARYNGITIS, UNSPECIFIED ETIOLOGY: ICD-10-CM

## 2018-05-03 DIAGNOSIS — J22 LOWER RESP. TRACT INFECTION: Primary | ICD-10-CM

## 2018-05-03 PROCEDURE — 94640 AIRWAY INHALATION TREATMENT: CPT | Performed by: NURSE PRACTITIONER

## 2018-05-03 PROCEDURE — 96372 THER/PROPH/DIAG INJ SC/IM: CPT | Performed by: NURSE PRACTITIONER

## 2018-05-03 PROCEDURE — 99214 OFFICE O/P EST MOD 30 MIN: CPT | Performed by: NURSE PRACTITIONER

## 2018-05-03 RX ORDER — ALBUTEROL SULFATE 2.5 MG/3ML
2.5 SOLUTION RESPIRATORY (INHALATION) ONCE
Status: COMPLETED | OUTPATIENT
Start: 2018-05-03 | End: 2018-05-03

## 2018-05-03 RX ORDER — CEFTRIAXONE 1 G/1
1 INJECTION, POWDER, FOR SOLUTION INTRAMUSCULAR; INTRAVENOUS ONCE
Status: COMPLETED | OUTPATIENT
Start: 2018-05-03 | End: 2018-05-03

## 2018-05-03 RX ORDER — ALBUTEROL SULFATE 90 UG/1
2 AEROSOL, METERED RESPIRATORY (INHALATION) EVERY 6 HOURS PRN
Qty: 1 INHALER | Refills: 1 | OUTPATIENT
Start: 2018-05-03 | End: 2020-11-09

## 2018-05-03 RX ORDER — METHYLPREDNISOLONE ACETATE 80 MG/ML
80 INJECTION, SUSPENSION INTRA-ARTICULAR; INTRALESIONAL; INTRAMUSCULAR; SOFT TISSUE ONCE
Status: COMPLETED | OUTPATIENT
Start: 2018-05-03 | End: 2018-05-03

## 2018-05-03 RX ORDER — LEVOFLOXACIN 750 MG/1
750 TABLET ORAL DAILY
Qty: 5 TABLET | Refills: 0 | Status: SHIPPED | OUTPATIENT
Start: 2018-05-03 | End: 2019-07-25

## 2018-05-03 RX ORDER — PROMETHAZINE HYDROCHLORIDE AND CODEINE PHOSPHATE 6.25; 1 MG/5ML; MG/5ML
5 SYRUP ORAL EVERY 4 HOURS PRN
Qty: 240 ML | Refills: 0 | Status: SHIPPED | OUTPATIENT
Start: 2018-05-03 | End: 2019-07-25

## 2018-05-03 RX ADMIN — METHYLPREDNISOLONE ACETATE 80 MG: 80 INJECTION, SUSPENSION INTRA-ARTICULAR; INTRALESIONAL; INTRAMUSCULAR; SOFT TISSUE at 14:44

## 2018-05-03 RX ADMIN — CEFTRIAXONE 1 G: 1 INJECTION, POWDER, FOR SOLUTION INTRAMUSCULAR; INTRAVENOUS at 14:44

## 2018-05-03 RX ADMIN — ALBUTEROL SULFATE 2.5 MG: 2.5 SOLUTION RESPIRATORY (INHALATION) at 14:43

## 2018-05-03 NOTE — PROGRESS NOTES
"Chief Complaint   Patient presents with   • URI     x4days symptoms. Pt states \"i have a cough and sore throat.\" pt also having some hoarseness.       Subjective   Bertha Moncada is a 62 y.o. female presents to the office for evaluation of URI and productive cough X 4 days.    PMH  Hyperlipidemia- stable; well controlled with atorvastatin    Depression- stable; well controlled with prozac    Anxiety- stable; well controlled with prn xanax    GERD- stable; well controlled with prilosec    HPI   Patient presents for evaluation of productive cough with green sputum, headache pain, sore throat, chest congestion, fatigue, and right ear pain X 4 days. She denies known fever.   She recently returned from traveling abroad to [place]. She got sick her last day of travel.   Her chest is very sore from coughing. Sleep is disturbed from coughing.     She was last treated for tonsillitis on 04/05/18 with rocephin, steroid inj, and ceftin with good relief of tonsillitis symptoms.     URI    This is a new problem. The current episode started in the past 7 days. The problem has been gradually worsening. There has been no fever. Associated symptoms include congestion, coughing, headaches, joint pain, a plugged ear sensation, a sore throat, swollen glands and wheezing. Pertinent negatives include no abdominal pain, chest pain, diarrhea, dysuria, ear pain, joint swelling, nausea, neck pain, rash, rhinorrhea, sinus pain, sneezing or vomiting. Associated symptoms comments: Chest tightness.. She has tried nothing for the symptoms. The treatment provided no relief.   Cough   This is a new problem. The current episode started in the past 7 days. The problem has been gradually worsening. The problem occurs constantly. The cough is productive of purulent sputum. Associated symptoms include headaches, postnasal drip, a sore throat and wheezing. Pertinent negatives include no chest pain, chills, ear pain, eye redness, fever, rash, rhinorrhea or " shortness of breath. The symptoms are aggravated by lying down. She has tried cool air, body position changes and rest for the symptoms. The treatment provided mild relief. Her past medical history is significant for bronchitis and environmental allergies.     Family History   Problem Relation Age of Onset   • Cancer Other      Breast Cancer; Ovarian Cancer   • Hypertension Other    • Cancer Mother    • Ovarian cancer Mother    • Thyroid disease Mother    • Heart disease Father    • Alcohol abuse Father    • Diabetes Sister      Social History     Social History   • Marital status:      Spouse name: Matthew    • Number of children: N/A   • Years of education: N/A     Occupational History   • Not on file.     Social History Main Topics   • Smoking status: Never Smoker   • Smokeless tobacco: Never Used   • Alcohol use No   • Drug use: No   • Sexual activity: Yes     Partners: Male     Other Topics Concern   • Not on file     Social History Narrative   • No narrative on file       The following portions of the patient's history were reviewed and updated as appropriate: allergies, current medications, past family history, past medical history, past social history, past surgical history and problem list.    Review of Systems   Constitutional: Negative for activity change, chills, fatigue, fever and unexpected weight change.   HENT: Positive for congestion, postnasal drip, sore throat and trouble swallowing. Negative for ear pain, rhinorrhea, sinus pain, sinus pressure and sneezing.    Eyes: Negative.  Negative for pain and redness.   Respiratory: Positive for cough, chest tightness and wheezing. Negative for choking and shortness of breath.    Cardiovascular: Negative.  Negative for chest pain, palpitations and leg swelling.   Gastrointestinal: Negative.  Negative for abdominal distention, abdominal pain, constipation, diarrhea, nausea, rectal pain and vomiting.   Endocrine: Negative.    Genitourinary: Negative.   "Negative for decreased urine volume, difficulty urinating, dysuria, flank pain, hematuria and urgency.   Musculoskeletal: Positive for joint pain. Negative for gait problem and neck pain.   Skin: Negative.  Negative for rash and wound.   Allergic/Immunologic: Positive for environmental allergies.   Neurological: Positive for headaches. Negative for dizziness, syncope, weakness, light-headedness and numbness.   Hematological: Negative.    Psychiatric/Behavioral: Negative.  Negative for agitation, behavioral problems, confusion, self-injury, sleep disturbance and suicidal ideas. The patient is not nervous/anxious.      14 Point ROS completed with pertinent positives discussed. All other systems reviewed and are negative.       Objective   Encounter Vitals  /78   Pulse 76   Temp 98.3 °F (36.8 °C) (Tympanic)   Resp 22   Ht 167.6 cm (66\")   Wt 87.5 kg (193 lb)   SpO2 97%   BMI 31.15 kg/m²   Vitals:    05/03/18 1335 05/03/18 1443   BP: 110/78    Pulse: 76    Resp: 22    Temp: 98.3 °F (36.8 °C)    TempSrc: Tympanic    SpO2: 95% 97%   Weight: 87.5 kg (193 lb)    Height: 167.6 cm (66\")        Physical Exam   Constitutional: She is oriented to person, place, and time. Vital signs are normal. She appears well-developed and well-nourished. She appears ill.   HENT:   Head: Normocephalic and atraumatic.   Right Ear: External ear and ear canal normal. There is swelling and tenderness. Tympanic membrane is erythematous. A middle ear effusion is present.   Left Ear: Tympanic membrane, external ear and ear canal normal.   Nose: Right sinus exhibits frontal sinus tenderness. Right sinus exhibits no maxillary sinus tenderness. Left sinus exhibits frontal sinus tenderness. Left sinus exhibits no maxillary sinus tenderness.   Mouth/Throat: Uvula is midline and mucous membranes are normal. Posterior oropharyngeal erythema present. No posterior oropharyngeal edema.   Erythematous external ear canal   Eyes: Lids are normal. " Pupils are equal, round, and reactive to light.   Neck: Trachea normal and normal range of motion. Neck supple. No thyroid mass present.   Cardiovascular: Normal rate, regular rhythm, S1 normal, S2 normal, normal heart sounds and intact distal pulses.  Exam reveals no gallop and no friction rub.    No murmur heard.  Pulmonary/Chest: Effort normal. No respiratory distress. She has wheezes. She has rhonchi. She has no rales.   Anterior chest congestion and wheezing  Post Neb TX:  Greater airflow auscultated through all lung fields  Decreased wheezing thoroughout all lung fields  Patient verbalizes she can breathe mildly better   Abdominal: Soft. Normal appearance and bowel sounds are normal. She exhibits no mass. There is no rebound and no guarding.   Musculoskeletal: Normal range of motion.   Lymphadenopathy:     She has cervical adenopathy.        Right cervical: Superficial cervical adenopathy present.        Left cervical: Superficial cervical adenopathy present.   Neurological: She is alert and oriented to person, place, and time. She has normal strength. No cranial nerve deficit or sensory deficit. Gait normal.   Skin: Skin is warm and dry. No rash noted.   Psychiatric: She has a normal mood and affect. Her speech is normal and behavior is normal. Judgment and thought content normal. Cognition and memory are normal.   Nursing note and vitals reviewed.      Pertinent Labs  Office Visit on 04/05/2018   Component Date Value Ref Range Status   • Rapid Strep A Screen 04/05/2018 Negative  Negative, VALID, INVALID, Not Performed Final   • Internal Control 04/05/2018 Passed  Passed Final   • Lot Number 04/05/2018 [id number]   Final   • Expiration Date 04/05/2018 2/19   Final     Labs have been independently reviewed    Key Imaging/Tracings/POC Testing    Assessment and Medications  Problems Addressed this Visit     None      Visit Diagnoses     Lower resp. tract infection    -  Primary    Relevant Medications     cefTRIAXone (ROCEPHIN) injection 1 g (Completed) (Start on 5/3/2018  3:15 PM)    methylPREDNISolone acetate (DEPO-medrol) injection 80 mg (Completed) (Start on 5/3/2018  3:15 PM)    levoFLOXacin (LEVAQUIN) 750 MG tablet    promethazine-codeine (PHENERGAN with CODEINE) 6.25-10 MG/5ML syrup    albuterol (PROVENTIL HFA) 108 (90 Base) MCG/ACT inhaler    Bronchitis        Relevant Medications    promethazine-codeine (PHENERGAN with CODEINE) 6.25-10 MG/5ML syrup    albuterol (PROVENTIL HFA) 108 (90 Base) MCG/ACT inhaler    Pharyngitis, unspecified etiology        Relevant Medications    cefTRIAXone (ROCEPHIN) injection 1 g (Completed) (Start on 5/3/2018  3:15 PM)    methylPREDNISolone acetate (DEPO-medrol) injection 80 mg (Completed) (Start on 5/3/2018  3:15 PM)    levoFLOXacin (LEVAQUIN) 750 MG tablet    Wheezing        Relevant Medications    albuterol (PROVENTIL) nebulizer solution 0.083% 2.5 mg/3mL (Completed) (Start on 5/3/2018  3:15 PM)    Cough        Relevant Medications    promethazine-codeine (PHENERGAN with CODEINE) 6.25-10 MG/5ML syrup        Side effects of ordered medications discussed with patient.     Plan/Additional Notes/Instructions  Plan   1. Rocephin and steroid inj today for URI/LRI and wheezing  2. Levaquin for LRI  3. Antitussive for cough  4. Albuterol inhaler for wheezing  5. IBU for throat pain and swelling  6. mucinex for expectorant  7. Increase PO water intake  8. Warm salt water gargles  9. Use home ciprodex ear gtts to right ear BID X 3-5 days  10. Rest  11. RTC if no better after completion of above ordered POC, or if symptoms worsen  12. If you experience respiratory distress, chest pain, confusion, syncope, lethargy, or feelings of impending doom, call 911 or have someone drive you to the nearest ER.    Follow-Up  Return if symptoms worsen or fail to improve.    Patient/caregiver verbalizes understanding of all orders and instructions in this plan of care.           This document has  been electronically signed by DILLAN Figueroa on May 3, 2018 2:55 PM

## 2018-05-11 RX ORDER — FLUOXETINE HYDROCHLORIDE 20 MG/1
60 CAPSULE ORAL
Qty: 90 CAPSULE | Refills: 3 | Status: SHIPPED | OUTPATIENT
Start: 2018-05-11 | End: 2018-08-10 | Stop reason: SDUPTHER

## 2018-07-31 RX ORDER — ATORVASTATIN CALCIUM 10 MG/1
TABLET, FILM COATED ORAL
Qty: 30 TABLET | Refills: 5 | Status: SHIPPED | OUTPATIENT
Start: 2018-07-31

## 2018-08-13 RX ORDER — FLUOXETINE HYDROCHLORIDE 20 MG/1
60 CAPSULE ORAL
Qty: 90 CAPSULE | Refills: 0 | Status: SHIPPED | OUTPATIENT
Start: 2018-08-13 | End: 2018-10-02 | Stop reason: SDUPTHER

## 2018-10-02 RX ORDER — FLUOXETINE HYDROCHLORIDE 20 MG/1
60 CAPSULE ORAL
Qty: 90 CAPSULE | Refills: 0 | Status: SHIPPED | OUTPATIENT
Start: 2018-10-02 | End: 2018-10-15 | Stop reason: SDUPTHER

## 2018-10-15 RX ORDER — FLUOXETINE HYDROCHLORIDE 20 MG/1
60 CAPSULE ORAL
Qty: 90 CAPSULE | Refills: 2 | Status: SHIPPED | OUTPATIENT
Start: 2018-10-15

## 2019-06-13 ENCOUNTER — TRANSCRIBE ORDERS (OUTPATIENT)
Dept: GENERAL RADIOLOGY | Facility: CLINIC | Age: 64
End: 2019-06-13

## 2019-06-13 DIAGNOSIS — N95.8 OTHER SPECIFIED MENOPAUSAL AND PERIMENOPAUSAL DISORDERS: Primary | ICD-10-CM

## 2020-11-09 ENCOUNTER — LAB (OUTPATIENT)
Dept: LAB | Facility: OTHER | Age: 65
End: 2020-11-09

## 2020-11-09 PROCEDURE — U0003 INFECTIOUS AGENT DETECTION BY NUCLEIC ACID (DNA OR RNA); SEVERE ACUTE RESPIRATORY SYNDROME CORONAVIRUS 2 (SARS-COV-2) (CORONAVIRUS DISEASE [COVID-19]), AMPLIFIED PROBE TECHNIQUE, MAKING USE OF HIGH THROUGHPUT TECHNOLOGIES AS DESCRIBED BY CMS-2020-01-R: HCPCS | Performed by: NURSE PRACTITIONER

## 2021-03-04 ENCOUNTER — IMMUNIZATION (OUTPATIENT)
Dept: VACCINE CLINIC | Facility: HOSPITAL | Age: 66
End: 2021-03-04

## 2021-03-04 PROCEDURE — 0001A: CPT | Performed by: NURSE PRACTITIONER

## 2021-03-04 PROCEDURE — 91300 HC SARSCOV02 VAC 30MCG/0.3ML IM: CPT | Performed by: NURSE PRACTITIONER

## 2021-03-25 ENCOUNTER — IMMUNIZATION (OUTPATIENT)
Dept: VACCINE CLINIC | Facility: HOSPITAL | Age: 66
End: 2021-03-25

## 2021-03-25 PROCEDURE — 0002A: CPT | Performed by: THORACIC SURGERY (CARDIOTHORACIC VASCULAR SURGERY)

## 2021-03-25 PROCEDURE — 91300 HC SARSCOV02 VAC 30MCG/0.3ML IM: CPT | Performed by: THORACIC SURGERY (CARDIOTHORACIC VASCULAR SURGERY)

## 2021-04-02 ENCOUNTER — TRANSCRIBE ORDERS (OUTPATIENT)
Dept: GENERAL RADIOLOGY | Facility: CLINIC | Age: 66
End: 2021-04-02

## 2021-04-02 DIAGNOSIS — M25.561 RIGHT KNEE PAIN, UNSPECIFIED CHRONICITY: Primary | ICD-10-CM

## 2021-04-02 DIAGNOSIS — R22.41 LOCALIZED SWELLING, MASS AND LUMP, LOWER LIMB, RIGHT: ICD-10-CM

## 2021-04-14 ENCOUNTER — TRANSCRIBE ORDERS (OUTPATIENT)
Dept: GENERAL RADIOLOGY | Facility: CLINIC | Age: 66
End: 2021-04-14

## 2021-09-01 PROCEDURE — 87635 SARS-COV-2 COVID-19 AMP PRB: CPT | Performed by: NURSE PRACTITIONER
